# Patient Record
Sex: MALE | Race: WHITE | NOT HISPANIC OR LATINO | Employment: FULL TIME | URBAN - METROPOLITAN AREA
[De-identification: names, ages, dates, MRNs, and addresses within clinical notes are randomized per-mention and may not be internally consistent; named-entity substitution may affect disease eponyms.]

---

## 2017-08-23 ENCOUNTER — OFFICE VISIT (OUTPATIENT)
Dept: FAMILY MEDICINE | Facility: CLINIC | Age: 27
End: 2017-08-23
Payer: COMMERCIAL

## 2017-08-23 VITALS
OXYGEN SATURATION: 98 % | HEART RATE: 54 BPM | HEIGHT: 68 IN | DIASTOLIC BLOOD PRESSURE: 60 MMHG | TEMPERATURE: 97.8 F | RESPIRATION RATE: 19 BRPM | SYSTOLIC BLOOD PRESSURE: 110 MMHG | BODY MASS INDEX: 24.08 KG/M2 | WEIGHT: 158.9 LBS

## 2017-08-23 DIAGNOSIS — R10.32 INGUINAL PAIN, LEFT: Primary | ICD-10-CM

## 2017-08-23 PROCEDURE — 99214 OFFICE O/P EST MOD 30 MIN: CPT | Performed by: FAMILY MEDICINE

## 2017-08-23 NOTE — NURSING NOTE
"Chief Complaint   Patient presents with     Groin Pain       Initial /60 (BP Location: Right arm, Patient Position: Chair, Cuff Size: Adult Regular)  Pulse 54  Temp 97.8  F (36.6  C) (Oral)  Resp 19  Ht 5' 8\" (1.727 m)  Wt 158 lb 14.4 oz (72.1 kg)  SpO2 98%  BMI 24.16 kg/m2 Estimated body mass index is 24.16 kg/(m^2) as calculated from the following:    Height as of this encounter: 5' 8\" (1.727 m).    Weight as of this encounter: 158 lb 14.4 oz (72.1 kg).    Medication Reconciliation: complete    Rachelle Reyes [facility]      Health Maintenance- Has Been Reviewed.                "

## 2017-08-23 NOTE — MR AVS SNAPSHOT
"              After Visit Summary   8/23/2017    Fili Love    MRN: [id number]           Patient Information     Date Of Birth          1990        Visit Information        Provider Department      8/23/2017 3:30 PM Jhoan Hernandez MD [facility]        Today's Diagnoses     Inguinal pain, left    -  1       Follow-ups after your visit        Who to contact     If you have questions or need follow up information about today's clinic visit or your schedule please contact [facility] directly at [phone].  Normal or non-critical lab and imaging results will be communicated to you by SCP Eventshart, letter or phone within 4 business days after the clinic has received the results. If you do not hear from us within 7 days, please contact the clinic through Onfant or phone. If you have a critical or abnormal lab result, we will notify you by phone as soon as possible.  Submit refill requests through Snackr or call your pharmacy and they will forward the refill request to us. Please allow 3 business days for your refill to be completed.          Additional Information About Your Visit        MyChart Information     Snackr gives you secure access to your electronic health record. If you see a primary care provider, you can also send messages to your care team and make appointments. If you have questions, please call your primary care clinic.  If you do not have a primary care provider, please call [phone] and they will assist you.        Care EveryWhere ID     This is your Care EveryWhere ID. This could be used by other organizations to access your [facility] medical records  [id number]        Your Vitals Were     Pulse Temperature Respirations Height Pulse Oximetry BMI (Body Mass Index)    54 97.8  F (36.6  C) (Oral) 19 5' 8\" (1.727 m) 98% 24.16 kg/m2       Blood Pressure from Last 3 Encounters:   08/23/17 110/60   06/30/16 112/60   01/11/16 100/60    Weight from Last 3 " Encounters:   08/23/17 158 lb 14.4 oz (72.1 kg)   06/30/16 153 lb 6.4 oz (69.6 kg)   01/11/16 157 lb 11.2 oz (71.5 kg)                 Today's Medication Changes          These changes are accurate as of: 8/23/17 11:59 PM.  If you have any questions, ask your nurse or doctor.               Stop taking these medicines if you haven't already. Please contact your care team if you have questions.     triamcinolone 0.1 % cream   Commonly known as:  KENALOG   Stopped by:  Jhoan Hernandez MD                    Primary Care Provider Office Phone # Fax #    Jhoan Hernandez [phone] [id number]  [facility] [address]        Equal Access to Services     NEFTALY MELGAR AH: Krystina farriso Sokirstin, waaxda luqadaha, qaybta kaalmada adeegyada, joseluis srivastava . So [facility] [phone].    ATENCIÓN: Si habla español, tiene a moore disposición servicios gratuitos de asistencia lingüística. Llame al [phone].    We comply with applicable federal civil rights laws and [place] laws. We do not discriminate on the basis of race, color, national origin, age, disability sex, sexual orientation or gender identity.            Thank you!     Thank you for choosing [facility]  for your care. Our goal is always to provide you with excellent care. Hearing back from our patients is one way we can continue to improve our services. Please take a few minutes to complete the written survey that you may receive in the mail after your visit with us. Thank you!             Your Updated Medication List - Protect others around you: Learn how to safely use, store and throw away your medicines at www.disposemymeds.org.      Notice  As of 8/23/2017 11:59 PM    You have not been prescribed any medications.

## 2017-08-29 NOTE — PROGRESS NOTES
SUBJECTIVE:                                                    Fili Love is a 26 year old male who presents to clinic today for the following health issues:    Patient presents with:  Groin Pain    Patient with ongoing issue of groin pain in the left groin. Patient does have history of varicocele repair on that side but has not noticed any significant recurrence or mass in the groin or testicle. He has pain with activity and with lifting. We have evaluated for hernia in the past with ultrasound and exam without noting any problem.  Testicle ultrasound shows just small left varicocele and a tiny simple cyst of the left epididymal head, small bilateral hydrocele, no significant findings to explain pain are present. No urinary symptoms. No high-risk sexual contact. No hematochezia or melena or constipation or diarrhea.    Patient Active Problem List   Diagnosis     Keratosis Pilaris     Disorder of male genital organs     STILL'S MURMUR     Past Surgical History:   Procedure Laterality Date     HC EXCISE VARICOCELE  5/07       Social History   Substance Use Topics     Smoking status: Never Smoker     Smokeless tobacco: Never Used     Alcohol use Yes      Comment: social     Family History   Problem Relation Age of Onset     CANCER Paternal Grandmother      BACK     CANCER Paternal Grandfather      TESTICULAR     Thyroid Disease Maternal Grandmother      hypothyroid     Family History Negative Mother      Born 1959     Family History Negative Father      Born 1962     Family History Negative Brother      1     Family History Negative Sister      1         ROS:  CONSTITUTIONAL:NEGATIVE for fever, chills, change in weight  GI: NEGATIVE for nausea, heartburn, or change in bowel habits, no constipation or diarrhea, no hematochezia or melena  : negative for dysuria, hematuria, decreased urinary stream, erectile dysfunction    OBJECTIVE:                                                    /60 (BP Location:  "Right arm, Patient Position: Chair, Cuff Size: Adult Regular)  Pulse 54  Temp 97.8  F (36.6  C) (Oral)  Resp 19  Ht 5' 8\" (1.727 m)  Wt 158 lb 14.4 oz (72.1 kg)  SpO2 98%  BMI 24.16 kg/m2Body mass index is 24.16 kg/(m^2).  GENERAL APPEARANCE: healthy, alert and no distress  ABDOMEN: soft, nontender, without hepatosplenomegaly or masses and bowel sounds normal   (male): testicles normal without atrophy or masses, no hernias and penis normal without urethral discharge  MS: extremities normal- no gross deformities noted  SKIN: no suspicious lesions or rashes     ASSESSMENT/PLAN:                                                    1. Inguinal pain, left  Unclear cause of inguinal pain with normal exam and previous ultrasound showing just minor issues that would not typically cause ongoing pain. Discussed doing CT scan with Valsalva to help rule out possible small hernia as he has exertional/lifting symptoms in the left groin area. If this is normal then consider urology follow up to see if nerve issue and inguinal nerve could be caused symptom after previous surgery.  - CT Abdomen Pelvis w/o Contrast; Future    Jhoan Hernandez MD  [facility]    "

## 2018-01-16 ENCOUNTER — OFFICE VISIT (OUTPATIENT)
Dept: FAMILY MEDICINE | Facility: CLINIC | Age: 28
End: 2018-01-16
Payer: COMMERCIAL

## 2018-01-16 VITALS
HEIGHT: 68 IN | WEIGHT: 167 LBS | RESPIRATION RATE: 19 BRPM | SYSTOLIC BLOOD PRESSURE: 110 MMHG | HEART RATE: 57 BPM | BODY MASS INDEX: 25.31 KG/M2 | TEMPERATURE: 98.4 F | OXYGEN SATURATION: 98 % | DIASTOLIC BLOOD PRESSURE: 62 MMHG

## 2018-01-16 DIAGNOSIS — R00.2 PALPITATIONS: Primary | ICD-10-CM

## 2018-01-16 PROCEDURE — 99214 OFFICE O/P EST MOD 30 MIN: CPT | Performed by: FAMILY MEDICINE

## 2018-01-16 PROCEDURE — 93000 ELECTROCARDIOGRAM COMPLETE: CPT | Performed by: FAMILY MEDICINE

## 2018-01-16 NOTE — MR AVS SNAPSHOT
After Visit Summary   1/16/2018    Fili Love    MRN: [id number]           Patient Information     Date Of Birth          1990        Visit Information        Provider Department      1/16/2018 7:20 AM Jhoan Hernandez MD [facility]        Today's Diagnoses     Palpitations    -  1      Care Instructions                  Heart Palpitations  What are palpitations?   Palpitations are an uncomfortable awareness of your heartbeat. You may feel that your heart is beating harder or faster than usual or that it is skipping beats.   Palpitations are common and often normal. They are a symptom, not a disease. However, it is important to figure out what is causing them.   How do they occur?   Palpitations may be brought on by:   exercise   stress, anxiety, or fear   smoking   alcohol   too much caffeine from coffee, mee, or tea   anemia   heart problems, such as mitral valve prolapse   a thyroid problem   medicines, such as diet pills and decongestants   premenstrual syndrome (PMS)   a lack of certain vitamins or minerals   low blood sugar or an insulin reaction in diabetics.   What are the symptoms?   Symptoms may include:   a thumping, pounding, or racing feeling in your chest or neck   fluttering sensation in your chest   feeling of irregular beating or skipped beats.   How are they diagnosed?   Your healthcare provider will review your symptoms and examine you. You may have an electrocardiogram (ECG) or other tests to help find the cause. You may be given a heart monitor to wear at home. You may have an ultrasound test of the heart called an echocardiogram or an exercise stress test to see if heart problems are causing the palpitations.   How are they treated?   Treatment of palpitations depends on the cause. Most often, no treatment is needed because the heart is otherwise normal. Drinking less coffee or alcohol or none at all may be all you need to do. Trying to reduce the  stress in your life may help. Some medicines can decrease or prevent the palpitations. Talk with your healthcare provider about this.   How can I take care of myself?   Take the medicine prescribed and follow your healthcare provider's advice for lifestyle changes.   Keep a record of when, how often, and for how long you have each episode of palpitations. It is helpful for your provider to know if the palpitations start suddenly or gradually and whether they stop suddenly or gradually. Note what you are doing and whether you notice any other symptoms when you have palpitations.   Do not smoke. Tell your provider if you need help quitting.   Don't drink alcohol. Talk with your provider if you have problems with this.   Keep a healthy weight. If you are overweight, talk to your provider about losing weight.   Eat a healthy diet.   Exercise regularly, according to your provider's advice.   Learn to relax. Reduce stress and anxiety in your life.   Call your healthcare provider right away if:   You have palpitations that last a few hours.   They occur often.   You also have sweating; shortness of breath; lightheadedness; nausea; vomiting; or pain in the chest, arm, back, or jaw.   If the palpitations happen often, particularly if you also have chest pain, breathlessness, or dizziness, you may have another medical problem that your healthcare provider can identify and treat.     Published by [employer].  This content is reviewed periodically and is subject to change as new health information becomes available. The information is intended to inform and educate and is not a replacement for medical evaluation, advice, diagnosis or treatment by a healthcare professional.   Developed by Nuris Latif MD, for [employer].   ? 2010 [employer] and/or its affiliates. All Rights Reserved.   Copyright   [employer] 2011              Follow-ups after your visit        Who to contact     If you have questions or need  "follow up information about today's clinic visit or your schedule please contact [facility] directly at [phone].  Normal or non-critical lab and imaging results will be communicated to you by MyChart, letter or phone within 4 business days after the clinic has received the results. If you do not hear from us within 7 days, please contact the clinic through Flixwagonhart or phone. If you have a critical or abnormal lab result, we will notify you by phone as soon as possible.  Submit refill requests through Cinetraffic or call your pharmacy and they will forward the refill request to us. Please allow 3 business days for your refill to be completed.          Additional Information About Your Visit        [employer]wagon[employer] Information     Cinetraffic gives you secure access to your electronic health record. If you see a primary care provider, you can also send messages to your care team and make appointments. If you have questions, please call your primary care clinic.  If you do not have a primary care provider, please call [phone] and they will assist you.        Care EveryWhere ID     This is your Care EveryWhere ID. This could be used by other organizations to access your [facility] medical records  [id number]        Your Vitals Were     Pulse Temperature Respirations Height Pulse Oximetry BMI (Body Mass Index)    57 98.4  F (36.9  C) (Oral) 19 5' 8\" (1.727 m) 98% 25.39 kg/m2       Blood Pressure from Last 3 Encounters:   01/16/18 110/62   08/23/17 110/60   06/30/16 112/60    Weight from Last 3 Encounters:   01/16/18 167 lb (75.8 kg)   08/23/17 158 lb 14.4 oz (72.1 kg)   06/30/16 153 lb 6.4 oz (69.6 kg)              We Performed the Following     EKG 12-lead complete w/read - Clinics        Primary Care Provider Office Phone # Fax #    Jhoan Hernandez [phone]  [facility] [address]        Equal Access to Services     NEFTALY MELGAR AH: Hadii vernell Bowling " jay li waxcarter irmain hayaajesus cesarkj saavedra. So [facility] [phone].    ATENCIÓN: Si lorenla rin, tiene a moore disposición servicios gratuitos de asistencia lingüística. Llame al [phone].    We comply with applicable federal civil rights laws and [place] laws. We do not discriminate on the basis of race, color, national origin, age, disability, sex, sexual orientation, or gender identity.            Thank you!     Thank you for choosing [facility]  for your care. Our goal is always to provide you with excellent care. Hearing back from our patients is one way we can continue to improve our services. Please take a few minutes to complete the written survey that you may receive in the mail after your visit with us. Thank you!             Your Updated Medication List - Protect others around you: Learn how to safely use, store and throw away your medicines at www.disposemymeds.org.      Notice  As of 1/16/2018  7:52 AM    You have not been prescribed any medications.

## 2018-01-16 NOTE — PROGRESS NOTES
SUBJECTIVE:   Fili Love is a 27 year old male who presents to clinic today for the following health issues:    Patient reports intermittent palpitations everyday for the past three weeks. He also has intermittent difficulties taking a deep breath. The first time this occurred the patient was at the gym working out. Before working out he took a pre workout supplement that has caffeine. Denies fever, cough, heartburn, chest pain.     Problem list and histories reviewed & adjusted, as indicated.  Additional history: as documented    Patient Active Problem List   Diagnosis     Keratosis Pilaris     Disorder of male genital organs     STILL'S MURMUR     Past Surgical History:   Procedure Laterality Date     HC EXCISE VARICOCELE  5/07       Social History   Substance Use Topics     Smoking status: Never Smoker     Smokeless tobacco: Never Used     Alcohol use Yes      Comment: social     Family History   Problem Relation Age of Onset     CANCER Paternal Grandmother      BACK     CANCER Paternal Grandfather      TESTICULAR     Thyroid Disease Maternal Grandmother      hypothyroid     Family History Negative Mother      Born 1959     Family History Negative Father      Born 1962     Family History Negative Brother      1     Family History Negative Sister      1         Reviewed and updated as needed this visit by clinical staffTobacco  Allergies  Meds  Med Hx  Surg Hx  Fam Hx  Soc Hx      Reviewed and updated as needed this visit by Provider         ROS:  C: NEGATIVE for fever  R: as above   CV: as above   GI: NEGATIVE for heartburn or reflux    This document serves as a record of the services and decisions personally performed and made by Jhoan Hernandez MD. It was created on his behalf by Tootie Ortega, a trained medical scribe. The creation of this document is based on the provider's statements to the medical scribe.  Tootie Ortega 7:40 AM January 16, 2018    OBJECTIVE:     /62 (BP Location: Right  "arm, Patient Position: Chair, Cuff Size: Adult Regular)  Pulse 57  Temp 98.4  F (36.9  C) (Oral)  Resp 19  Ht 1.727 m (5' 8\")  Wt 75.8 kg (167 lb)  SpO2 98%  BMI 25.39 kg/m2  Body mass index is 25.39 kg/(m^2).  GENERAL: healthy, alert and no distress  RESP: lungs clear to auscultation - no rales, rhonchi or wheezes  CV: regular rate and rhythm, normal S1 S2, no S3 or S4, no murmur, click or rub, no peripheral edema and peripheral pulses strong  ABDOMEN: soft, nontender, no hepatosplenomegaly, no masses and bowel sounds normal    EKG: appears normal, NSR, normal axis, normal intervals, no acute ST/T changes c/w ischemia, no LVH by voltage criteria, unchanged from previous tracings    ASSESSMENT/PLAN:     1. Palpitations  Appears to be secondary to PVCs or PACs based on history. Normal EKG and exam. No other high risk or concerning symptoms at this time. Follow up if symptoms continue or other associated symptoms occur.  - EKG 12-lead complete w/read - Clinics    The information in this document, created by the medical scribe for me, accurately reflects the services I personally performed and the decisions made by me. I have reviewed and approved this document for accuracy prior to leaving the patient care area.  January 16, 2018 8:06 AM    Jhoan Hernandez MD  [facility]    "

## 2018-01-16 NOTE — NURSING NOTE
"Chief Complaint   Patient presents with     Palpitations       Initial /62 (BP Location: Right arm, Patient Position: Chair, Cuff Size: Adult Regular)  Pulse 57  Temp 98.4  F (36.9  C) (Oral)  Resp 19  Ht 5' 8\" (1.727 m)  Wt 167 lb (75.8 kg)  SpO2 98%  BMI 25.39 kg/m2 Estimated body mass index is 25.39 kg/(m^2) as calculated from the following:    Height as of this encounter: 5' 8\" (1.727 m).    Weight as of this encounter: 167 lb (75.8 kg).    Medication Reconciliation: complete    Health Maintenance addressed:  NONE    n/a    Rachelle Reyes CMA                         "

## 2018-01-16 NOTE — PATIENT INSTRUCTIONS
Heart Palpitations  What are palpitations?   Palpitations are an uncomfortable awareness of your heartbeat. You may feel that your heart is beating harder or faster than usual or that it is skipping beats.   Palpitations are common and often normal. They are a symptom, not a disease. However, it is important to figure out what is causing them.   How do they occur?   Palpitations may be brought on by:   exercise   stress, anxiety, or fear   smoking   alcohol   too much caffeine from coffee, mee, or tea   anemia   heart problems, such as mitral valve prolapse   a thyroid problem   medicines, such as diet pills and decongestants   premenstrual syndrome (PMS)   a lack of certain vitamins or minerals   low blood sugar or an insulin reaction in diabetics.   What are the symptoms?   Symptoms may include:   a thumping, pounding, or racing feeling in your chest or neck   fluttering sensation in your chest   feeling of irregular beating or skipped beats.   How are they diagnosed?   Your healthcare provider will review your symptoms and examine you. You may have an electrocardiogram (ECG) or other tests to help find the cause. You may be given a heart monitor to wear at home. You may have an ultrasound test of the heart called an echocardiogram or an exercise stress test to see if heart problems are causing the palpitations.   How are they treated?   Treatment of palpitations depends on the cause. Most often, no treatment is needed because the heart is otherwise normal. Drinking less coffee or alcohol or none at all may be all you need to do. Trying to reduce the stress in your life may help. Some medicines can decrease or prevent the palpitations. Talk with your healthcare provider about this.   How can I take care of myself?   Take the medicine prescribed and follow your healthcare provider's advice for lifestyle changes.   Keep a record of when, how often, and for how long you have each episode of  palpitations. It is helpful for your provider to know if the palpitations start suddenly or gradually and whether they stop suddenly or gradually. Note what you are doing and whether you notice any other symptoms when you have palpitations.   Do not smoke. Tell your provider if you need help quitting.   Don't drink alcohol. Talk with your provider if you have problems with this.   Keep a healthy weight. If you are overweight, talk to your provider about losing weight.   Eat a healthy diet.   Exercise regularly, according to your provider's advice.   Learn to relax. Reduce stress and anxiety in your life.   Call your healthcare provider right away if:   You have palpitations that last a few hours.   They occur often.   You also have sweating; shortness of breath; lightheadedness; nausea; vomiting; or pain in the chest, arm, back, or jaw.   If the palpitations happen often, particularly if you also have chest pain, breathlessness, or dizziness, you may have another medical problem that your healthcare provider can identify and treat.     Published by [employer].  This content is reviewed periodically and is subject to change as new health information becomes available. The information is intended to inform and educate and is not a replacement for medical evaluation, advice, diagnosis or treatment by a healthcare professional.   Developed by Nuris Latif MD, for [employer].   ? 2010 [employer] and/or its affiliates. All Rights Reserved.   Copyright   [employer] 2011

## 2018-11-07 ENCOUNTER — OFFICE VISIT (OUTPATIENT)
Dept: FAMILY MEDICINE | Facility: CLINIC | Age: 28
End: 2018-11-07
Payer: COMMERCIAL

## 2018-11-07 VITALS
SYSTOLIC BLOOD PRESSURE: 108 MMHG | DIASTOLIC BLOOD PRESSURE: 66 MMHG | TEMPERATURE: 98.2 F | HEART RATE: 52 BPM | OXYGEN SATURATION: 98 % | HEIGHT: 68 IN | RESPIRATION RATE: 16 BRPM | BODY MASS INDEX: 24.25 KG/M2 | WEIGHT: 160 LBS

## 2018-11-07 DIAGNOSIS — R10.32 LEFT INGUINAL PAIN: ICD-10-CM

## 2018-11-07 DIAGNOSIS — R21 SKIN RASH: Primary | ICD-10-CM

## 2018-11-07 PROCEDURE — 99213 OFFICE O/P EST LOW 20 MIN: CPT | Performed by: FAMILY MEDICINE

## 2018-11-07 NOTE — PROGRESS NOTES
SUBJECTIVE:   Fili Love is a 28 year old male who presents to clinic today for the following health issues:    Patient reports skin irritation located at his waistline for the past two months. This worsens with activity.      Patient with history of varicocele repair. Patient here with ongoing groin pains. He describes the pain as pressure or a sensation of a lump, feeling like a hernia.  However, there is no lump or bump at that area. The pain worsens while wearing compression shorts, with activity and performing ab workouts. This was evaluated for a hernia in the past. The ultrasound and exam were both normal. The testicle ultrasound showed just small left varicocele and a tiny simple cyst of the left epididymal head, small bilateral hydrocele, no significant findings to explain pain are present.     Problem list and histories reviewed & adjusted, as indicated.  Additional history: as documented    Patient Active Problem List   Diagnosis     Keratosis Pilaris     Disorder of male genital organs     STILL'S MURMUR     Past Surgical History:   Procedure Laterality Date     HC EXCISE VARICOCELE  5/07       Social History   Substance Use Topics     Smoking status: Never Smoker     Smokeless tobacco: Never Used     Alcohol use Yes      Comment: social     Family History   Problem Relation Age of Onset     Cancer Paternal Grandmother      BACK     Cancer Paternal Grandfather      TESTICULAR     Thyroid Disease Maternal Grandmother      hypothyroid     Family History Negative Mother      Born 1959     Family History Negative Father      Born 1962     Family History Negative Brother      1     Family History Negative Sister      1           Reviewed and updated as needed this visit by clinical staff  Tobacco  Allergies  Meds  Soc Hx      Reviewed and updated as needed this visit by Provider         ROS:  INTEGUMENTARY/SKIN: as noted above   : as noted above     This document serves as a record of the  "services and decisions personally performed and made by Jhoan Hernandez MD. It was created on his behalf by Tootie Ortega, a trained medical scribe. The creation of this document is based on the provider's statements to the medical scribe.  Tootie Ortega 2:49 PM November 7, 2018    OBJECTIVE:     /66 (BP Location: Right arm, Patient Position: Chair, Cuff Size: Adult Regular)  Pulse 52  Temp 98.2  F (36.8  C) (Oral)  Resp 16  Ht 1.727 m (5' 8\")  Wt 72.6 kg (160 lb)  SpO2 98%  BMI 24.33 kg/m2  Body mass index is 24.33 kg/(m^2).  GENERAL: healthy, alert and no distress   (male): normal male genitalia without lesions or urethral discharge, no hernia  SKIN: 2 X 3 cm slight erythema anterior belt line     ASSESSMENT/PLAN:     1. Skin rash  Unclear mild rash - discussed trial of treatment for tinea vs dermatitis with topical OTC treatments.  Follow-up if not improving in 2 months.    2. Left inguinal pain  Unclear if inguinal nerve irritation with prior surgery vs hernia (not noted on exam) vs musculoskeletal/radiculopathy.  Could consider pelvic CT with valsalva vs evaluation with surgeon for their opinion - patient would like to see surgery.  - GENERAL SURG ADULT REFERRAL    The information in this document, created by the medical scribe for me, accurately reflects the services I personally performed and the decisions made by me. I have reviewed and approved this document for accuracy prior to leaving the patient care area.  November 7, 2018 3:03 PM    Jhoan Hernandez MD  [facility]    "

## 2018-11-07 NOTE — MR AVS SNAPSHOT
"              After Visit Summary   11/7/2018    Fili Love    MRN: [id number]           Patient Information     Date Of Birth          1990        Visit Information        Provider Department      11/7/2018 2:40 PM Jhoan Hernandez MD [facility]        Today's Diagnoses     Skin rash    -  1       Follow-ups after your visit        Who to contact     If you have questions or need follow up information about today's clinic visit or your schedule please contact [facility] directly at [phone].  Normal or non-critical lab and imaging results will be communicated to you by MyChart, letter or phone within 4 business days after the clinic has received the results. If you do not hear from us within 7 days, please contact the clinic through [employer] or phone. If you have a critical or abnormal lab result, we will notify you by phone as soon as possible.  Submit refill requests through [employer] or call your pharmacy and they will forward the refill request to us. Please allow 3 business days for your refill to be completed.          Additional Information About Your Visit        MyChart Information     [facility] gives you secure access to your electronic health record. If you see a primary care provider, you can also send messages to your care team and make appointments. If you have questions, please call your primary care clinic.  If you do not have a primary care provider, please call [phone] and they will assist you.        Care EveryWhere ID     This is your Care EveryWhere ID. This could be used by other organizations to access your [place] medical records  [id number]        Your Vitals Were     Pulse Temperature Respirations Height Pulse Oximetry BMI (Body Mass Index)    52 98.2  F (36.8  C) (Oral) 16 5' 8\" (1.727 m) 98% 24.33 kg/m2       Blood Pressure from Last 3 Encounters:   11/07/18 108/66   01/16/18 110/62   08/23/17 110/60    Weight from Last 3 Encounters: "   11/07/18 160 lb (72.6 kg)   01/16/18 167 lb (75.8 kg)   08/23/17 158 lb 14.4 oz (72.1 kg)              Today, you had the following     No orders found for display       Primary Care Provider Office Phone # Fax #    Jhoan Hernandez [phone] [id number]  [facility] [id number]        Equal Access to Services     NEFTALY MELGAR : Hadii aad ku hadasho Soomaali, waaxda luqadaha, qaybta kaalmada adeegyada, waxay idiin hayaan adeeg kharash lagunjan . So [facility] [phone].    ATENCIÓN: Si habla español, tiene a moore disposición servicios gratuitos de asistencia lingüística. Llame al [phone].    We comply with applicable federal civil rights laws and [place] laws. We do not discriminate on the basis of race, color, national origin, age, disability, sex, sexual orientation, or gender identity.            Thank you!     Thank you for choosing [facility]  for your care. Our goal is always to provide you with excellent care. Hearing back from our patients is one way we can continue to improve our services. Please take a few minutes to complete the written survey that you may receive in the mail after your visit with us. Thank you!             Your Updated Medication List - Protect others around you: Learn how to safely use, store and throw away your medicines at www.disposemymeds.org.      Notice  As of 11/7/2018  2:55 PM    You have not been prescribed any medications.

## 2019-12-16 ENCOUNTER — HEALTH MAINTENANCE LETTER (OUTPATIENT)
Age: 29
End: 2019-12-16

## 2019-12-20 ENCOUNTER — MYC MEDICAL ADVICE (OUTPATIENT)
Dept: FAMILY MEDICINE | Facility: CLINIC | Age: 29
End: 2019-12-20

## 2019-12-23 NOTE — TELEPHONE ENCOUNTER
Please advise if pt should have an ultrasound first before seeing urology  Matt Mendieta RN, BSN

## 2019-12-23 NOTE — TELEPHONE ENCOUNTER
See my last note.  I was thinking he could have an inguinal neuralgia that he has related to his prior groin surgery.  Recommended consider CT with Valsalva of pelvis to rule out hernia and see general surgery.  At this point I think that follow-up with specialist first is likely more appropriate and they can order appropriate imaging after being seen.

## 2020-12-07 ASSESSMENT — ENCOUNTER SYMPTOMS
COUGH: 0
PARESTHESIAS: 0
FREQUENCY: 0
ARTHRALGIAS: 0
SORE THROAT: 0
NERVOUS/ANXIOUS: 0
HEMATURIA: 0
DIZZINESS: 0
CONSTIPATION: 0
SHORTNESS OF BREATH: 0
WEAKNESS: 0
HEARTBURN: 0
MYALGIAS: 0
NAUSEA: 0
JOINT SWELLING: 0
CHILLS: 0
FEVER: 0
HEMATOCHEZIA: 0
EYE PAIN: 0
DIARRHEA: 0
PALPITATIONS: 0
HEADACHES: 0
ABDOMINAL PAIN: 0
DYSURIA: 0

## 2020-12-08 ENCOUNTER — OFFICE VISIT (OUTPATIENT)
Dept: FAMILY MEDICINE | Facility: CLINIC | Age: 30
End: 2020-12-08
Payer: COMMERCIAL

## 2020-12-08 VITALS
HEART RATE: 67 BPM | BODY MASS INDEX: 24.4 KG/M2 | SYSTOLIC BLOOD PRESSURE: 110 MMHG | DIASTOLIC BLOOD PRESSURE: 64 MMHG | RESPIRATION RATE: 16 BRPM | WEIGHT: 161 LBS | TEMPERATURE: 97.9 F | OXYGEN SATURATION: 99 % | HEIGHT: 68 IN

## 2020-12-08 DIAGNOSIS — N50.812 PAIN IN LEFT TESTICLE: Primary | ICD-10-CM

## 2020-12-08 DIAGNOSIS — Z11.59 NEED FOR HEPATITIS C SCREENING TEST: ICD-10-CM

## 2020-12-08 DIAGNOSIS — Z00.00 VISIT FOR PREVENTIVE HEALTH EXAMINATION: ICD-10-CM

## 2020-12-08 DIAGNOSIS — Z11.4 SCREENING FOR HIV (HUMAN IMMUNODEFICIENCY VIRUS): ICD-10-CM

## 2020-12-08 PROCEDURE — 99395 PREV VISIT EST AGE 18-39: CPT | Performed by: FAMILY MEDICINE

## 2020-12-08 PROCEDURE — 99213 OFFICE O/P EST LOW 20 MIN: CPT | Mod: 25 | Performed by: FAMILY MEDICINE

## 2020-12-08 ASSESSMENT — ENCOUNTER SYMPTOMS
HEADACHES: 0
NERVOUS/ANXIOUS: 0
WEAKNESS: 0
CONSTIPATION: 0
ARTHRALGIAS: 0
PARESTHESIAS: 0
ABDOMINAL PAIN: 0
CHILLS: 0
DIZZINESS: 0
JOINT SWELLING: 0
HEMATOCHEZIA: 0
NAUSEA: 0
PALPITATIONS: 0
FEVER: 0
DIARRHEA: 0
SHORTNESS OF BREATH: 0
SORE THROAT: 0
FREQUENCY: 0
MYALGIAS: 0
HEARTBURN: 0
COUGH: 0
HEMATURIA: 0
EYE PAIN: 0
DYSURIA: 0

## 2020-12-08 ASSESSMENT — MIFFLIN-ST. JEOR: SCORE: 1664.79

## 2020-12-08 NOTE — PROGRESS NOTES
Subjective     Fili Love is a 30 year old male who presents to clinic today for the following health issues:    Pain  Pertinent negatives include no abdominal pain, arthralgias, chest pain, chills, congestion, coughing, fever, headaches, joint swelling, myalgias, nausea, rash, sore throat or weakness.   Healthy Habits:     Getting at least 3 servings of Calcium per day:  Yes    Bi-annual eye exam:  Yes    Dental care twice a year:  Yes    Sleep apnea or symptoms of sleep apnea:  None    Diet:  Regular (no restrictions)    Frequency of exercise:  4-5 days/week    Duration of exercise:  45-60 minutes    Taking medications regularly:  Yes    Medication side effects:  Not applicable    PHQ-2 Total Score: 0    Additional concerns today:  No           Musculoskeletal problem/pain  Onset/Duration: x  2 months  Description  Location: testicle - left  Joint Swelling: no  Redness: no  Pain: YES, intermittent discomfort.  Currently no pain.  Occasionally occurs and does not seem to be related to physical activity.  Warmth: no  Intensity:  moderate  Progression of Symptoms:  worsening  Accompanying signs and symptoms:   Fevers: no  Numbness/tingling/weakness: no  History  Trauma to the area: no  Recent illness:  no  Previous similar problem: no  Previous evaluation:  no  Precipitating or alleviating factors:  Aggravating factors include: None.   Therapies tried and outcome: nothing          Review of Systems   Constitutional: Negative for chills and fever.   HENT: Negative for congestion, ear pain, hearing loss and sore throat.    Eyes: Negative for pain and visual disturbance.   Respiratory: Negative for cough and shortness of breath.    Cardiovascular: Negative for chest pain, palpitations and peripheral edema.   Gastrointestinal: Negative for abdominal pain, constipation, diarrhea, heartburn, hematochezia and nausea.   Genitourinary: Negative for discharge, dysuria, frequency, genital sores, hematuria, impotence and  "urgency.   Musculoskeletal: Negative for arthralgias, joint swelling and myalgias.   Skin: Negative for rash.   Neurological: Negative for dizziness, weakness, headaches and paresthesias.   Psychiatric/Behavioral: Negative for mood changes. The patient is not nervous/anxious.           Objective    /64 (BP Location: Right arm, Patient Position: Chair, Cuff Size: Adult Regular)   Pulse 67   Temp 97.9  F (36.6  C) (Oral)   Resp 16   Ht 1.727 m (5' 8\")   Wt 73 kg (161 lb)   SpO2 99%   BMI 24.48 kg/m    Body mass index is 24.48 kg/m .  Physical Exam  Vitals signs reviewed.   Constitutional:       General: He is not in acute distress.     Appearance: Normal appearance. He is not ill-appearing.   HENT:      Head: Normocephalic and atraumatic.      Right Ear: External ear normal.      Left Ear: External ear normal.      Nose: Nose normal.      Mouth/Throat:      Mouth: Mucous membranes are moist.      Pharynx: Oropharynx is clear.   Eyes:      General: No scleral icterus.        Right eye: No discharge.         Left eye: No discharge.      Extraocular Movements: Extraocular movements intact.      Pupils: Pupils are equal, round, and reactive to light.   Neck:      Musculoskeletal: Normal range of motion.      Vascular: No JVD.   Cardiovascular:      Rate and Rhythm: Normal rate and regular rhythm.   Pulmonary:      Effort: Pulmonary effort is normal. No respiratory distress.      Breath sounds: Normal breath sounds.   Abdominal:      General: Abdomen is flat. Bowel sounds are normal.      Palpations: Abdomen is soft.   Genitourinary:     Comments: Penis appears normal, no expressible urethral discharge.  Palpable inguinal hernias.  Bilateral testicles are nontender to palpation.  Epididymis unremarkable.  No obvious masses.  Musculoskeletal:         General: No swelling.   Lymphadenopathy:      Cervical: No cervical adenopathy.   Skin:     General: Skin is warm.      Capillary Refill: Capillary refill takes " "less than 2 seconds.   Neurological:      General: No focal deficit present.      Mental Status: He is alert.   Psychiatric:         Mood and Affect: Mood normal.         Behavior: Behavior normal.          1-: \"IMPRESSION:   1. Small left varicocele.  2. Tiny simple cyst in the left epididymal head.  3. Small bilateral hydroceles.    4. No convincing sonographic evidence for epididymitis.  \"        Assessment & Plan     Visit for preventive health examination    Pain in left testicle  Subacute problem.  History of left-sided varicocele, epididymitis.  He is status post a varicocele excision in 2006.  Physical examination overall unremarkable.  Currently do not suspect testicular torsion.  I do think he needs a repeat ultrasound for further characterization as he did have a history of a left epididymal cyst.  No urinalysis collected as he has no lower urinary tract symptoms.  Patient has self referred to urology and has an appointment as well next month.  Advised to keep appointment.  Abruptly having left testicle pain that is getting worse, go to the emergency department.  - US Testicular and Scrotum; Future    Screening for HIV (human immunodeficiency virus)  Patient declined blood work.    Need for hepatitis C screening test            Return in about 1 year (around 12/8/2021) for Annual Preventative Visit..    Trey Adame MD  [facility]    "

## 2020-12-08 NOTE — PATIENT INSTRUCTIONS
Patient Education     Testicular Pain, Unclear Cause   You have had pain in one or both testicles. Based on your exam today, the exact cause of your pain is not certain. But your condition doesn't appear to be dangerous. Testicles are very sensitive. Even a small injury can cause quite a bit of pain. Other possible causes of testicular pain include kidney stones, cysts, mumps, inflammatory conditions, chronic conditions, hernia, infection, and a twisted testicle.  Certain tests may be done to rule out an underlying problem causing the pain. Nothing conclusive was found today. Most likely, the pain will go away on its own. If it doesn t, you may need more tests.    Home care  Medicine may be prescribed to help relieve pain and swelling. This may be an over-the-counter pain reliever or prescription pain medicine. Take all medicine as directed.  The following are general care guidelines:    To relieve pain and swelling, apply an ice pack wrapped in a thin towel for 10 minutes at a time. Continue this on and off for 1 to 2 days.    When lying down, place a small rolled towel under your scrotum. When moving around, wear a jockstrap (athletic supporter) or supportive underwear. These will help support and protect your testicles.    If it hurts to walk, walk as little as possible until you feel better.    Don't do any strenuous activity until you feel better.    Don't have sex until you feel better.    If you have severe pain in the testicle, seek care right away. Delay may lead to permanent loss of the testicle s function.  Follow-up care  Follow up with your healthcare provider, or as advised.  When to seek medical advice  Call your healthcare provider right away if any of these occur:    Fever of 100.4 F (38 C) or higher, or as directed by your provider    Worsening of the pain or severe pain    Swelling of the testicle or scrotum    A lump in the scrotum    Warm and red scrotum (signs of infection)    Nausea and  vomiting    Pain or swelling in abdomen    Trouble peeing    Numbness or weakness in the leg    Shrinking of the testicle    Blood in your urine  Lesa last reviewed this educational content on 9/1/2019 2000-2020 [employer]. [address] [place] [address], [place] [address]. All rights reserved. This information is not intended as a substitute for professional medical care. Always follow your healthcare professional's instructions.

## 2020-12-14 ENCOUNTER — HOSPITAL ENCOUNTER (OUTPATIENT)
Dept: ULTRASOUND IMAGING | Facility: CLINIC | Age: 30
Discharge: HOME | End: 2020-12-14
Attending: FAMILY MEDICINE | Admitting: FAMILY MEDICINE
Payer: COMMERCIAL

## 2020-12-14 DIAGNOSIS — N50.812 PAIN IN LEFT TESTICLE: ICD-10-CM

## 2020-12-14 PROCEDURE — 76870 US EXAM SCROTUM: CPT

## 2021-01-06 ENCOUNTER — OFFICE VISIT (OUTPATIENT)
Dept: UROLOGY | Facility: CLINIC | Age: 31
End: 2021-01-06
Payer: COMMERCIAL

## 2021-01-06 VITALS
HEART RATE: 61 BPM | SYSTOLIC BLOOD PRESSURE: 122 MMHG | DIASTOLIC BLOOD PRESSURE: 82 MMHG | OXYGEN SATURATION: 98 % | HEIGHT: 69 IN | BODY MASS INDEX: 23.7 KG/M2 | WEIGHT: 160 LBS

## 2021-01-06 DIAGNOSIS — I86.1 VARICOCELE: Primary | ICD-10-CM

## 2021-01-06 LAB
ALBUMIN UR-MCNC: NEGATIVE MG/DL
APPEARANCE UR: CLEAR
BILIRUB UR QL STRIP: NEGATIVE
COLOR UR AUTO: YELLOW
GLUCOSE UR STRIP-MCNC: NEGATIVE MG/DL
HGB UR QL STRIP: NEGATIVE
KETONES UR STRIP-MCNC: NEGATIVE MG/DL
LEUKOCYTE ESTERASE UR QL STRIP: NEGATIVE
NITRATE UR QL: NEGATIVE
PH UR STRIP: 7.5 PH (ref 5–7)
SOURCE: ABNORMAL
SP GR UR STRIP: 1.01 (ref 1–1.03)
UROBILINOGEN UR STRIP-ACNC: 0.2 EU/DL (ref 0.2–1)

## 2021-01-06 PROCEDURE — 81003 URINALYSIS AUTO W/O SCOPE: CPT | Performed by: UROLOGY

## 2021-01-06 PROCEDURE — 99203 OFFICE O/P NEW LOW 30 MIN: CPT | Performed by: UROLOGY

## 2021-01-06 ASSESSMENT — PAIN SCALES - GENERAL: PAINLEVEL: NO PAIN (0)

## 2021-01-06 ASSESSMENT — MIFFLIN-ST. JEOR: SCORE: 1676.14

## 2021-01-06 NOTE — PROGRESS NOTES
Urology Consult History and Physical  [place]   Name: Fili Love    MRN: [id number]   YOB: 1990       We were asked to see Fili Love at the request of Dr. Adame for evaluation and treatment of bilateral testicular discomfort, bilateral varicocele.        Chief Complaint:   bilateral testicular discomfort, bilateral varicocele    History is obtained from the patient            History of Present Illness:   Fili Love is a 30 year old male who is being seen for evaluation of bilateral testicular discomfort, bilateral varicocele    Having intermittent testicular pain over the past few years  Mostly a dull irritation which is worse mountain biking or physical activity   Know about a varicoele for years  Discomfort is generally on the left side  Discomfort or irritation is very mild    Had a prior left laparoscopic varicocele repair 5/11/2007             Past Medical History:     Past Medical History:   Diagnosis Date     Epididymitis             Past Surgical History:     Past Surgical History:   Procedure Laterality Date     HC EXCISE VARICOCELE  5/07            Social History:     Social History     Tobacco Use     Smoking status: Never Smoker     Smokeless tobacco: Never Used   Substance Use Topics     Alcohol use: Yes     Comment: social       History   Smoking Status     Never Smoker   Smokeless Tobacco     Never Used            Family History:     Family History   Problem Relation Age of Onset     Cancer Paternal Grandmother         BACK     Cancer Paternal Grandfather         TESTICULAR     Thyroid Disease Maternal Grandmother         hypothyroid     Family History Negative Mother         Born 1959     Family History Negative Father         Born 1962     Family History Negative Brother         1     Family History Negative Sister         1              Allergies:   No Known Allergies         Medications:     No current outpatient medications on file.     No current  "facility-administered medications for this visit.              Review of Systems:     Skin: negative  Eyes: negative  Ears/Nose/Throat: negative  Respiratory: No shortness of breath, dyspnea on exertion, cough, or hemoptysis  Cardiovascular: negative  Gastrointestinal: negative  Genitourinary: as above  Musculoskeletal: negative  Neurologic: negative  Psychiatric: negative  Hematologic/Lymphatic/Immunologic: negative  Endocrine: negative          Physical Exam:     Patient Vitals for the past 24 hrs:   BP Pulse SpO2 Height Weight   01/06/21 1619 122/82 61 98 % 1.753 m (5' 9\") 72.6 kg (160 lb)     Body mass index is 23.63 kg/m .     General: age-appropriate appearing male in NAD  HEENT: Head AT/NC, EOMI, CN Grossly intact  Lungs: no respiratory distress, or pursed lip breathing  Heart: No obvious jugular venous distension present  Back: no bony midline tenderness, no CVAT bilaterally.  Abdomen: soft, non-distended, non-tender. No organomegaly  : normal circumcised phallus, normal testis bilaterally with symmetric size, normal epididymus bilaterally, no notable varicocele visible on exam with or without valsalva  Lymph: no palpable inguinal lymphadenopathy.  LE: no edema.   Musculoskeltal: extremities normal, no peripheral edema  Skin: no suspicious lesions or rashes  Neuro: Alert, oriented, speech and mentation normal;  moving all 4 extremities equally.  Psych: affect and mood normal          Data:   All laboratory data reviewed:    UA RESULTS:  Recent Labs   Lab Test 01/11/16  0739   COLOR Yellow   APPEARANCE Clear   URINEGLC Negative   URINEBILI Negative   URINEKETONE Negative   SG 1.015   UBLD Negative   URINEPH 6.0   PROTEIN Negative   UROBILINOGEN 0.2   NITRITE Negative   LEUKEST Negative      Lab Results   Component Value Date    CR 1.00 08/01/2011      IMAGING:  All pertinent imaging reviewed:    All imaging studies reviewed by me.  I personally reviewed these imaging films.  A formal report from radiology " will follow.    FINDINGS:     RIGHT: Right testicle measures 4.3 x 3.7 x 2.6 cm. Normal testicle  with no masses. Normal arterial duplex and normal color flow. Normal  epididymis. Mild hydrocele. Mild varicocele, new since 1/19/2016     LEFT: Left testicle measures 4.6 x 3.1 x 2.1 cm. Normal testicle with  no masses. Normal arterial duplex and normal color flow. Normal  epididymis with a 3 mm benign cyst in the epididymal head. Mild  hydrocele. Mild varicocele, not significantly changed.                                                                      IMPRESSION:  1.  Mild bilateral varicoceles, new on the right since 2016 and  unchanged on the left.  2.  Mild bilateral hydroceles, not significantly changed.  3.  Stable small benign left epididymal head cyst.         Impression and Plan:   Impression:   30 year old man with history of left varicocele s/p laparoscopic repair in 2007 now with bilateral Grade 0/1 subclinical varicoceles and intermittent vague testicular irritation      Plan:   Bilateral varicocele on U/S  - I reviewed his U/S images  - I reviewed his prior operative report  - We discussed that on clinical exam he would be categorized at grade 0/1 varicoceles which are not identifiable on exam  - We discussed the indications for treatment would be worsening or bothersome pain which would impact his daily activities  - We discussed that at this time I recommend observation and he may follow up PRN     Thank you for the kind consultation.    Time spent: 14 minutes spent on the date of the encounter doing chart review, history and exam, documentation and further activities as noted above.    Nam Salvador MD   Urology  [facility]  [facility] [place] Phone: [phone]  [facility] Phone: [phone]

## 2021-01-06 NOTE — NURSING NOTE
Chief Complaint   Patient presents with     tesicle pain     patient states it is more irritation. It has been going on for a few years   Felicia Bartlett LPN

## 2021-01-06 NOTE — LETTER
1/6/2021       RE: Fili Love  [address] [facility] [address]     Dear Colleague,    Thank you for referring your patient, Fili Love, to the [facility] [place] [facility]. Please see a copy of my visit note below.    Urology Consult History and Physical  [place]   Name: Fili Love    MRN: [id number]   YOB: 1990       We were asked to see Fili Love at the request of Dr. Adame for evaluation and treatment of bilateral testicular discomfort, bilateral varicocele.        Chief Complaint:   bilateral testicular discomfort, bilateral varicocele    History is obtained from the patient            History of Present Illness:   Fili Love is a 30 year old male who is being seen for evaluation of bilateral testicular discomfort, bilateral varicocele    Having intermittent testicular pain over the past few years  Mostly a dull irritation which is worse mountain biking or physical activity   Know about a varicoele for years  Discomfort is generally on the left side  Discomfort or irritation is very mild    Had a prior left laparoscopic varicocele repair 5/11/2007             Past Medical History:     Past Medical History:   Diagnosis Date     Epididymitis             Past Surgical History:     Past Surgical History:   Procedure Laterality Date     HC EXCISE VARICOCELE  5/07            Social History:     Social History     Tobacco Use     Smoking status: Never Smoker     Smokeless tobacco: Never Used   Substance Use Topics     Alcohol use: Yes     Comment: social       History   Smoking Status     Never Smoker   Smokeless Tobacco     Never Used            Family History:     Family History   Problem Relation Age of Onset     Cancer Paternal Grandmother         BACK     Cancer Paternal Grandfather         TESTICULAR     Thyroid Disease Maternal Grandmother         hypothyroid     Family History Negative Mother   "       Born 1959     Family History Negative Father         Born 1962     Family History Negative Brother         1     Family History Negative Sister         1              Allergies:   No Known Allergies         Medications:     No current outpatient medications on file.     No current facility-administered medications for this visit.              Review of Systems:     Skin: negative  Eyes: negative  Ears/Nose/Throat: negative  Respiratory: No shortness of breath, dyspnea on exertion, cough, or hemoptysis  Cardiovascular: negative  Gastrointestinal: negative  Genitourinary: as above  Musculoskeletal: negative  Neurologic: negative  Psychiatric: negative  Hematologic/Lymphatic/Immunologic: negative  Endocrine: negative          Physical Exam:     Patient Vitals for the past 24 hrs:   BP Pulse SpO2 Height Weight   01/06/21 1619 122/82 61 98 % 1.753 m (5' 9\") 72.6 kg (160 lb)     Body mass index is 23.63 kg/m .     General: age-appropriate appearing male in NAD  HEENT: Head AT/NC, EOMI, CN Grossly intact  Lungs: no respiratory distress, or pursed lip breathing  Heart: No obvious jugular venous distension present  Back: no bony midline tenderness, no CVAT bilaterally.  Abdomen: soft, non-distended, non-tender. No organomegaly  : normal circumcised phallus, normal testis bilaterally with symmetric size, normal epididymus bilaterally, no notable varicocele visible on exam with or without valsalva  Lymph: no palpable inguinal lymphadenopathy.  LE: no edema.   Musculoskeltal: extremities normal, no peripheral edema  Skin: no suspicious lesions or rashes  Neuro: Alert, oriented, speech and mentation normal;  moving all 4 extremities equally.  Psych: affect and mood normal          Data:   All laboratory data reviewed:    UA RESULTS:  Recent Labs   Lab Test 01/11/16  0739   COLOR Yellow   APPEARANCE Clear   URINEGLC Negative   URINEBILI Negative   URINEKETONE Negative   SG 1.015   UBLD Negative   URINEPH 6.0   PROTEIN " Negative   UROBILINOGEN 0.2   NITRITE Negative   LEUKEST Negative      Lab Results   Component Value Date    CR 1.00 08/01/2011      IMAGING:  All pertinent imaging reviewed:    All imaging studies reviewed by me.  I personally reviewed these imaging films.  A formal report from radiology will follow.    FINDINGS:     RIGHT: Right testicle measures 4.3 x 3.7 x 2.6 cm. Normal testicle  with no masses. Normal arterial duplex and normal color flow. Normal  epididymis. Mild hydrocele. Mild varicocele, new since 1/19/2016     LEFT: Left testicle measures 4.6 x 3.1 x 2.1 cm. Normal testicle with  no masses. Normal arterial duplex and normal color flow. Normal  epididymis with a 3 mm benign cyst in the epididymal head. Mild  hydrocele. Mild varicocele, not significantly changed.                                                                      IMPRESSION:  1.  Mild bilateral varicoceles, new on the right since 2016 and  unchanged on the left.  2.  Mild bilateral hydroceles, not significantly changed.  3.  Stable small benign left epididymal head cyst.         Impression and Plan:   Impression:   30 year old man with history of left varicocele s/p laparoscopic repair in 2007 now with bilateral Grade 0/1 subclinical varicoceles and intermittent vague testicular irritation      Plan:   Bilateral varicocele on U/S  - I reviewed his U/S images  - I reviewed his prior operative report  - We discussed that on clinical exam he would be categorized at grade 0/1 varicoceles which are not identifiable on exam  - We discussed the indications for treatment would be worsening or bothersome pain which would impact his daily activities  - We discussed that at this time I recommend observation and he may follow up PRN     Thank you for the kind consultation.    Time spent: 14 minutes spent on the date of the encounter doing chart review, history and exam, documentation and further activities as noted above.    Nam Salvador MD    Urology  [facility]  [facility] Phone: [phone]  [facility] Phone: [phone]

## 2021-05-13 ENCOUNTER — OFFICE VISIT (OUTPATIENT)
Dept: FAMILY MEDICINE | Facility: CLINIC | Age: 31
End: 2021-05-13
Payer: COMMERCIAL

## 2021-05-13 VITALS
SYSTOLIC BLOOD PRESSURE: 110 MMHG | RESPIRATION RATE: 16 BRPM | TEMPERATURE: 97.4 F | WEIGHT: 160 LBS | DIASTOLIC BLOOD PRESSURE: 64 MMHG | OXYGEN SATURATION: 99 % | HEART RATE: 60 BPM | BODY MASS INDEX: 23.63 KG/M2

## 2021-05-13 DIAGNOSIS — Z01.818 PRE-OP EXAM: Primary | ICD-10-CM

## 2021-05-13 DIAGNOSIS — S92.902K CLOSED FRACTURE OF LEFT FOOT WITH NONUNION, SUBSEQUENT ENCOUNTER: ICD-10-CM

## 2021-05-13 PROCEDURE — 99214 OFFICE O/P EST MOD 30 MIN: CPT | Performed by: PHYSICIAN ASSISTANT

## 2021-05-13 NOTE — PROGRESS NOTES
[facility]  [address] [address]  Phone: [phone]  Primary Provider: Jhoan Hernandez  Pre-op Performing Provider: AASEBY-AGUILERA, RAMONA ANN      PREOPERATIVE EVALUATION:  Today's date: 5/13/2021    Fili Love is a 30 year old male who presents for a preoperative evaluation.    Surgical Information:  Surgery/Procedure: fix navicular fracture and fuse talus  Surgery Location: [facility]  Surgeon: Michael Marte MD  Surgery Date: 5/17  Time of Surgery: 9:00  Where patient plans to recover: At home with family  Fax number for surgical facility: [phone]    Type of Anesthesia Anticipated: General    Assessment & Plan     The proposed surgical procedure is considered INTERMEDIATE risk.    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                      Subjective     HPI related to upcoming procedure: fractured foot 2 week ago    Preop Questions 5/13/2021   1. Have you ever had a heart attack or stroke? No   2. Have you ever had surgery on your heart or blood vessels, such as a stent placement, a coronary artery bypass, or surgery on an artery in your head, neck, heart, or legs? No   3. Do you have chest pain with activity? No   4. Do you have a history of  heart failure? No   5. Do you currently have a cold, bronchitis or symptoms of other infection? No   6. Do you have a cough, shortness of breath, or wheezing? No   7. Do you or anyone in your family have previous history of blood clots? No   8.  Do you or does anyone in your family have a serious bleeding problem such as prolonged bleeding following surgeries or cuts? No   9. Have you ever had problems with anemia or been told to take iron pills? No   10. Have you had any abnormal blood loss such as black, tarry or bloody stools? No   11. Have you ever had a blood transfusion? No   12. Are you willing to have a blood transfusion if it is medically needed before, during, or after your surgery? Yes   13. Have you or any of your relatives ever had problems with anesthesia? No   14. Do you have sleep apnea, excessive snoring or daytime drowsiness? No   15. Do you have any artifical heart valves or other implanted medical devices like a pacemaker, defibrillator, or continuous glucose monitor? No   16. Do you have artificial joints? No   17. Are you allergic to latex? No       Health Care Directive:  Patient does not have a Health Care Directive or Living Will:     Preoperative Review of :   reviewed - controlled substances reflected in medication list.      Status of Chronic Conditions:  See problem list for active medical problems.  Problems all longstanding and stable, except as noted/documented.  See ROS for pertinent symptoms related to these conditions.      Review of Systems  CONSTITUTIONAL: NEGATIVE for fever, chills, change in weight  INTEGUMENTARY/SKIN: NEGATIVE for worrisome rashes, moles or lesions  EYES: NEGATIVE for vision changes or irritation  ENT/MOUTH: NEGATIVE for ear, mouth and throat problems  RESP: NEGATIVE for significant cough or SOB  BREAST: NEGATIVE for masses, tenderness or discharge  CV: NEGATIVE for chest pain, palpitations or peripheral edema  GI: NEGATIVE for nausea, abdominal pain, heartburn, or change in bowel habits  : NEGATIVE for frequency, dysuria, or hematuria  MUSCULOSKELETAL: NEGATIVE for significant arthralgias or myalgia  NEURO: NEGATIVE for weakness, dizziness or paresthesias  ENDOCRINE: NEGATIVE for temperature  intolerance, skin/hair changes  HEME: NEGATIVE for bleeding problems  PSYCHIATRIC: NEGATIVE for changes in mood or affect    Patient Active Problem List    Diagnosis Date Noted     STILL'S MURMUR 05/10/2007     Priority: Medium     Disorder of male genital organs 03/31/2006     Priority: Medium     Problem list name updated by automated process. Provider to review       Keratosis Pilaris 04/13/2005     Priority: Medium      Past Medical History:   Diagnosis Date     Epididymitis      Past Surgical History:   Procedure Laterality Date     HC EXCISE VARICOCELE  5/07     No current outpatient medications on file.       No Known Allergies     Social History     Tobacco Use     Smoking status: Never Smoker     Smokeless tobacco: Never Used   Substance Use Topics     Alcohol use: Yes     Comment: social     Family History   Problem Relation Age of Onset     Cancer Paternal Grandmother         BACK     Cancer Paternal Grandfather         TESTICULAR     Thyroid Disease Maternal Grandmother         hypothyroid     Family History Negative Mother         Born 1959     Family History Negative Father         Born 1962     Family History Negative Brother         1     Family History Negative Sister         1     History   Drug Use No         Objective     /64   Pulse 60   Temp 97.4  F (36.3  C) (Tympanic)   Resp 16   Wt 72.6 kg (160 lb)   SpO2 99%   BMI 23.63 kg/m      Physical Exam    GENERAL APPEARANCE: healthy, alert and no distress     EYES: EOMI,  PERRL     HENT: ear canals and TM's normal and nose and mouth without ulcers or lesions     NECK: no adenopathy, no asymmetry, masses, or scars and thyroid normal to palpation     RESP: lungs clear to auscultation - no rales, rhonchi or wheezes     CV: regular rates and rhythm, normal S1 S2, no S3 or S4 and no murmur, click or rub     ABDOMEN:  soft, nontender, no HSM or masses and bowel sounds normal     MS: left extremity TTP     SKIN: no suspicious lesions or  rashes     NEURO: Normal strength and tone, sensory exam grossly normal, mentation intact and speech normal     PSYCH: mentation appears normal. and affect normal/bright     LYMPHATICS: No cervical adenopathy    No results for input(s): HGB, PLT, INR, NA, POTASSIUM, CR, A1C in the last 76369 hours.     Diagnostics:  No labs were ordered during this visit.   No EKG required, no history of coronary heart disease, significant arrhythmia, peripheral arterial disease or other structural heart disease.    Revised Cardiac Risk Index (RCRI):  The patient has the following serious cardiovascular risks for perioperative complications:   - No serious cardiac risks = 0 points     RCRI Interpretation: 0 points: Class I (very low risk - 0.4% complication rate)           Signed Electronically by: Ramona Ann Aaseby-Aguilera, PA-C  Copy of this evaluation report is provided to requesting physician.

## 2021-11-30 ENCOUNTER — E-VISIT (OUTPATIENT)
Dept: INTERNAL MEDICINE | Facility: CLINIC | Age: 31
End: 2021-11-30

## 2021-11-30 DIAGNOSIS — L50.9 HIVES: Primary | ICD-10-CM

## 2021-11-30 PROCEDURE — 99421 OL DIG E/M SVC 5-10 MIN: CPT | Performed by: PHYSICIAN ASSISTANT

## 2021-12-01 RX ORDER — CETIRIZINE HYDROCHLORIDE 10 MG/1
10 TABLET ORAL DAILY
Qty: 30 TABLET | Refills: 0 | Status: SHIPPED | OUTPATIENT
Start: 2021-12-01 | End: 2022-05-18

## 2021-12-01 RX ORDER — TRIAMCINOLONE ACETONIDE 1 MG/G
CREAM TOPICAL 2 TIMES DAILY
Qty: 30 G | Refills: 0 | Status: SHIPPED | OUTPATIENT
Start: 2021-12-01 | End: 2022-04-21

## 2022-04-14 ENCOUNTER — MYC MEDICAL ADVICE (OUTPATIENT)
Dept: FAMILY MEDICINE | Facility: CLINIC | Age: 32
End: 2022-04-14
Payer: COMMERCIAL

## 2022-04-21 ENCOUNTER — OFFICE VISIT (OUTPATIENT)
Dept: FAMILY MEDICINE | Facility: CLINIC | Age: 32
End: 2022-04-21
Payer: COMMERCIAL

## 2022-04-21 VITALS
HEART RATE: 60 BPM | WEIGHT: 177 LBS | RESPIRATION RATE: 16 BRPM | BODY MASS INDEX: 26.22 KG/M2 | TEMPERATURE: 97.7 F | SYSTOLIC BLOOD PRESSURE: 110 MMHG | DIASTOLIC BLOOD PRESSURE: 64 MMHG | OXYGEN SATURATION: 98 % | HEIGHT: 69 IN

## 2022-04-21 DIAGNOSIS — L50.9 HIVES: ICD-10-CM

## 2022-04-21 PROCEDURE — 99213 OFFICE O/P EST LOW 20 MIN: CPT | Performed by: FAMILY MEDICINE

## 2022-04-21 RX ORDER — TRIAMCINOLONE ACETONIDE 1 MG/G
CREAM TOPICAL 2 TIMES DAILY
Qty: 80 G | Refills: 1 | Status: SHIPPED | OUTPATIENT
Start: 2022-04-21 | End: 2024-04-16

## 2022-04-21 RX ORDER — MONTELUKAST SODIUM 10 MG/1
10 TABLET ORAL AT BEDTIME
Qty: 90 TABLET | Refills: 1 | Status: SHIPPED | OUTPATIENT
Start: 2022-04-21 | End: 2022-05-18

## 2022-04-21 ASSESSMENT — ENCOUNTER SYMPTOMS
COLOR CHANGE: 0
FEVER: 0
DYSURIA: 0
AGITATION: 0
COUGH: 0
HEADACHES: 0

## 2022-04-21 NOTE — PROGRESS NOTES
"  Assessment & Plan     Hives  - triamcinolone (KENALOG) 0.1 % external cream; Apply topically 2 times daily  - montelukast (SINGULAIR) 10 MG tablet; Take 1 tablet (10 mg) by mouth At Bedtime    Discussed etiology of hives ,  Discussed allergy consult .  Discussed Claritin in am , discussed benadryl and Pepcid .  Patient currently deferred allergy consult .  Recommend to contact with any new concern .     BMI:   Estimated body mass index is 26.14 kg/m  as calculated from the following:    Height as of this encounter: 1.753 m (5' 9\").    Weight as of this encounter: 80.3 kg (177 lb).   Weight management plan: Discussed healthy diet and exercise guidelines        Return in about 6 months (around 10/21/2022) for Routine preventive, patient will call.    Ivon Hill MD  [facility] TRISTEN Penn is a 31 year old who presents for the following health issues     Rash  Associated symptoms include a rash. Pertinent negatives include no congestion, coughing, fever or headaches.   History of Present Illness       Reason for visit:  Hives  Symptom onset:  3-4 weeks ago  Symptoms include:  Hives  Symptom intensity:  Mild  Symptom progression:  Staying the same  Had these symptoms before:  No  What makes it worse:  Sweat  What makes it better:  No    He eats 0-1 servings of fruits and vegetables daily.He consumes 1 sweetened beverage(s) daily.He exercises with enough effort to increase his heart rate 30 to 60 minutes per day.  He exercises with enough effort to increase his heart rate 6 days per week.   He is taking medications regularly.           Review of Systems   Constitutional: Negative for fever.   HENT: Negative for congestion.    Respiratory: Negative for cough.    Genitourinary: Negative for dysuria.   Skin: Positive for rash. Negative for color change.   Neurological: Negative for headaches.   Psychiatric/Behavioral: Negative for agitation.            Objective    /64 (BP Location: " "Right arm, Patient Position: Chair, Cuff Size: Adult Regular)   Pulse 60   Temp 97.7  F (36.5  C) (Oral)   Resp 16   Ht 1.753 m (5' 9\")   Wt 80.3 kg (177 lb)   SpO2 98%   BMI 26.14 kg/m    Body mass index is 26.14 kg/m .  Physical Exam  Pulmonary:      Effort: Pulmonary effort is normal.   Abdominal:      General: Abdomen is flat.   Skin:     General: Skin is warm.      Findings: Rash present.      Comments: Hives in the back and chest .   Psychiatric:         Mood and Affect: Mood normal.                "

## 2022-05-18 ENCOUNTER — TELEPHONE (OUTPATIENT)
Dept: FAMILY MEDICINE | Facility: CLINIC | Age: 32
End: 2022-05-18
Payer: COMMERCIAL

## 2022-05-18 DIAGNOSIS — L50.9 HIVES: ICD-10-CM

## 2022-05-18 RX ORDER — CETIRIZINE HYDROCHLORIDE 10 MG/1
10 TABLET ORAL DAILY
Qty: 30 TABLET | Refills: 0 | Status: SHIPPED | OUTPATIENT
Start: 2022-05-18 | End: 2024-04-16

## 2022-05-18 RX ORDER — MONTELUKAST SODIUM 10 MG/1
10 TABLET ORAL AT BEDTIME
Qty: 90 TABLET | Refills: 1 | Status: SHIPPED | OUTPATIENT
Start: 2022-05-18 | End: 2024-04-16

## 2022-05-18 NOTE — TELEPHONE ENCOUNTER
Called and spoke with pt, pt did not  Rx 4/21/22. Will send to new pharmacy.     Karli MENDEZ RN

## 2022-05-18 NOTE — TELEPHONE ENCOUNTER
Patient calling stating his insurance no longer covers at [facility], he needs all medication to be sent to [facility] in [place] on [address]. Pharmacy pended.  Anne Marie Levin,

## 2022-10-02 ENCOUNTER — OFFICE VISIT (OUTPATIENT)
Dept: URGENT CARE | Facility: URGENT CARE | Age: 32
End: 2022-10-02
Payer: COMMERCIAL

## 2022-10-02 VITALS
SYSTOLIC BLOOD PRESSURE: 127 MMHG | BODY MASS INDEX: 25.1 KG/M2 | RESPIRATION RATE: 20 BRPM | TEMPERATURE: 98.5 F | HEART RATE: 141 BPM | WEIGHT: 170 LBS | DIASTOLIC BLOOD PRESSURE: 79 MMHG | OXYGEN SATURATION: 98 %

## 2022-10-02 DIAGNOSIS — B02.9 HERPES ZOSTER WITHOUT COMPLICATION: Primary | ICD-10-CM

## 2022-10-02 PROCEDURE — 99213 OFFICE O/P EST LOW 20 MIN: CPT | Performed by: FAMILY MEDICINE

## 2022-10-02 RX ORDER — VALACYCLOVIR HYDROCHLORIDE 1 G/1
1000 TABLET, FILM COATED ORAL 3 TIMES DAILY
Qty: 30 TABLET | Refills: 1 | Status: SHIPPED | OUTPATIENT
Start: 2022-10-02 | End: 2022-10-12

## 2022-10-02 NOTE — PROGRESS NOTES
"    ASSESSMENT/  PLAN:  Herpes zoster without complication     - valACYclovir (VALTREX) 1000 mg tablet; Take 1 tablet (1,000 mg) by mouth 3 times daily for 10 days        We discussed that the antiviral medications can decrease the severity of the zoster attack and reduce the risk of post -herpetic neuralgia when started promptly at the start of zoster symptoms, and that a delay in starting treatment produces less favorable results.  .  I advised the patient that zoster is chicken pox virus, and that  unvaccinated small children, pregnant women and adults who have not had chicken pox should be avoided until all skin lesions have dried and scabbed over  to prevent transmission of the disease.      Follow-up with primary clinic if not improving     May take acetaminophen / ibuprofen as an alternative for pain    He declined viral testing, or monkey pox testing    ---------------------------------------------------------------------------------------------------------------------------------------------------------------------    SUBJECTIVE:  Chief Complaint   Patient presents with     Derm Problem     Rash on Left buttock area that is painful and \"burning sensation\" to it- couple days     Fili HUSAM Love is a 32 year old male  who presents to the clinic today for a  painful area of skin with  rash.  Onset   was 2 day(s) ago and  is sudden onset, still present and constant.    Location  : left buttocks.  Quality/symptoms : burning, painful, red and blistering   Symptoms are moderate and the affected skin seems to be worsening.  Previous history of a similar symptoms? No  Recent exposure history: none known -  No plant exposure-  Declines monkey pox testing    Associated symptoms include: nothing.    Past Medical History:   Diagnosis Date     Epididymitis      Patient Active Problem List   Diagnosis     Keratosis Pilaris     Disorder of male genital organs     STILL'S MURMUR       ALLERGIES:  Patient has no known " allergies.    MEDs  cetirizine (ZYRTEC) 10 MG tablet, Take 1 tablet (10 mg) by mouth daily  montelukast (SINGULAIR) 10 MG tablet, Take 1 tablet (10 mg) by mouth At Bedtime  triamcinolone (KENALOG) 0.1 % external cream, Apply topically 2 times daily    No current facility-administered medications on file prior to visit.      Social History     Tobacco Use     Smoking status: Never Smoker     Smokeless tobacco: Never Used   Substance Use Topics     Alcohol use: Yes     Comment: social       Family History   Problem Relation Age of Onset     Cancer Paternal Grandmother         BACK     Cancer Paternal Grandfather         TESTICULAR     Thyroid Disease Maternal Grandmother         hypothyroid     Family History Negative Mother         Born 1959     Family History Negative Father         Born 1962     Family History Negative Brother         1     Family History Negative Sister         1         ROS:  CONSTITUTIONAL:NEGATIVE for fever, chills, change in weight  EYES: NEGATIVE for vision changes or irritation  ENT/MOUTH: NEGATIVE for ear, mouth and throat problems  RESP:NEGATIVE for significant cough or SOB    EXAM:   /79   Pulse (!) 141   Temp 98.5  F (36.9  C)   Resp 20   Wt 77.1 kg (170 lb)   SpO2 98%   BMI 25.10 kg/m    GENERAL: alert, mild distress.  SKIN: Rash description:    Distribution: localized  Location:  left buttocks    Color: red,  Lesion type: vesicular, blotchy with swelling and inflammation      EYES: EOMI,  PERRL, conjunctiva clear  NECK: supple, non-tender to palpation, no adenopathy noted  ,RESP: lungs clear to auscultation - no rales, rhonchi or wheezes  CV: regular rates and rhythm, normal S1 S2, no murmur noted  MS:extremities normal- no gross deformities noted,  FROM noted in all extremities  NEURO: Normal strength and tone, sensory exam grossly normal,  normal speech and mentation

## 2024-01-11 ENCOUNTER — HOSPITAL ENCOUNTER (EMERGENCY)
Facility: CLINIC | Age: 34
Discharge: HOME | End: 2024-01-11
Attending: EMERGENCY MEDICINE | Admitting: EMERGENCY MEDICINE
Payer: COMMERCIAL

## 2024-01-11 VITALS
TEMPERATURE: 97.4 F | WEIGHT: 165 LBS | HEART RATE: 73 BPM | SYSTOLIC BLOOD PRESSURE: 102 MMHG | HEIGHT: 69 IN | OXYGEN SATURATION: 100 % | RESPIRATION RATE: 13 BRPM | DIASTOLIC BLOOD PRESSURE: 69 MMHG | BODY MASS INDEX: 24.44 KG/M2

## 2024-01-11 DIAGNOSIS — R55 VASOVAGAL SYNCOPE: ICD-10-CM

## 2024-01-11 LAB
ATRIAL RATE - MUSE: 59 BPM
DIASTOLIC BLOOD PRESSURE - MUSE: NORMAL MMHG
INTERPRETATION ECG - MUSE: NORMAL
P AXIS - MUSE: 64 DEGREES
PR INTERVAL - MUSE: 168 MS
QRS DURATION - MUSE: 96 MS
QT - MUSE: 398 MS
QTC - MUSE: 394 MS
R AXIS - MUSE: 43 DEGREES
SYSTOLIC BLOOD PRESSURE - MUSE: NORMAL MMHG
T AXIS - MUSE: 46 DEGREES
VENTRICULAR RATE- MUSE: 59 BPM

## 2024-01-11 PROCEDURE — 93005 ELECTROCARDIOGRAM TRACING: CPT

## 2024-01-11 PROCEDURE — 99283 EMERGENCY DEPT VISIT LOW MDM: CPT

## 2024-01-11 NOTE — ED TRIAGE NOTES
Pt presents to the ED with lightheadedness after having an episode of syncope at 0940. Pt was sitting down and did not fall. Pt lost consciousness for 1 minute and had some grunting and involuntary movement before coming to. Pt was at the [facility] with wife who was getting an US at the time. Pt reports a history of passing out during various medical related situations.     Triage Assessment (Adult)       Row Name 01/11/24 0956          Triage Assessment    Airway WDL WDL        Respiratory WDL    Respiratory WDL WDL        Skin Circulation/Temperature WDL    Skin Circulation/Temperature WDL WDL        Cardiac WDL    Cardiac WDL X  lightheaded        Peripheral/Neurovascular WDL    Peripheral Neurovascular WDL WDL        Cognitive/Neuro/Behavioral WDL    Cognitive/Neuro/Behavioral WDL WDL

## 2024-01-11 NOTE — ED PROVIDER NOTES
"  History     Chief Complaint:  Syncope     HPI   Fili Love is a 33 year old male who presents to the ED for evaluation after a syncopal episode today. Patient states that he was at his wife's OBGYN fetal ultrasound appointment when the provider started talking about a cyst and he became nervous. He then started feeling lightheaded and warm before passing out completely. He was sitting in a chair at the time and did not injure himself. Patient states that he has had similar episodes like this before when he is in medical situations. His wife expresses concerns that this episode lasted longer than previous episodes. She also notes that he had involuntary movements and grunting during today's episode, which he has not had before. Fili says he could feel himself moving while he was passed out. He states that he had some diaphoresis after the episode but feels normal now and denies any pain. He did not experienced a headache or chest pain prior to the syncopal episode. He adds that he did not eat anything this morning, which was the same as previous episodes. He also notes that he has been battling a cold for a while and has been taking a steroid nasal spray for this concern. Patient does not have any current concerns about anemia. He denies feeling dizzy when changing positions and any black/bloody stools. He also exercises regularly.     Independent Historian:   Spouse/Partner - They report additional history as noted above.    Review of External Notes:   none     Medications:    Zyrtec  Singulair     Past Medical History:    Epididymitis   Keratosis pilaris   Still's murmur     Past Surgical History:    Excise varicocele      Physical Exam   Patient Vitals for the past 24 hrs:   BP Temp Temp src Pulse Resp SpO2 Height Weight   01/11/24 1026 -- -- -- 62 12 100 % -- --   01/11/24 0958 102/69 97.4  F (36.3  C) Oral 56 16 100 % 1.753 m (5' 9\") 74.8 kg (165 lb)        Physical Exam  Nursing note and vitals " reviewed.  HENT:   Mouth/Throat: Moist mucous membranes.   Eyes: EOMI, nonicteric sclera  Cardiovascular: Normal rate, regular rhythm, no murmurs, rubs, or gallops  Pulmonary/Chest: Effort normal and breath sounds normal. No respiratory distress. No wheezes. No rales.   Abdominal: Soft. Nontender, nondistended, no guarding or rigidity.   Musculoskeletal: Normal range of motion.   Neurological: Alert. Moves all extremities spontaneously.   Skin: Skin is warm and dry. No rash noted.         Emergency Department Course   ECG  ECG taken at 1025, ECG read at 1037  Sinus bradycardia  Otherwise normal ECG   No significant change as compared to prior, dated 1/16/18.  Rate 59 bpm. ME interval 168 ms. QRS duration 96 ms. QT/QTc 398/394 ms. P-R-T axes 64 43 46.     Emergency Department Course & Assessments:    Assessments:  1006 I obtained history and examined the patient as noted above.    Social Determinants of Health affecting care:   None    Disposition:  The patient was discharged to home.     Impression & Plan        Medical Decision Making:    Pt presents with CC syncope. The differential for syncope is concerning for cardiac arrythmia, ACS, aortic stenosis, HOCM, PE, orthostatic hypotension, drugs, situational, carotid hypersensitivity, seizure, TIA, stroke.  There is no family history of sudden death, no chest pain, no post-ictal period, no murmur, and no signs of orthostasis in the ED, no focal neurologic symptoms, and no complaints of concerning headache.  The patient did not have prolonged LOC, sleepiness, repeated emesis, poor orientation, or significant irritability.  EKG is normal without any evidence of arrhythmia.  Discussed with patient and wife that this is classic vasovagal syncope and no further workup is indicated at this time.  We discussed strategies to avoid future episodes. He is in stable condition at the time of discharge, red flags that should merit ED return were discussed as well as recommended  follow-up instructions. All questions were answered and he is in agreement with the plan.        Diagnosis:    ICD-10-CM    1. Vasovagal syncope  R55            Scribe Disclosure:  I, Jennifer Carrillo, am serving as a scribe at 9:58 AM on 1/11/2024 to document services personally performed by Carlos Mejia MD based on my observations and the provider's statements to me.          Carlos Mejia MD  01/11/24 8621

## 2024-04-16 ENCOUNTER — OFFICE VISIT (OUTPATIENT)
Dept: FAMILY MEDICINE | Facility: CLINIC | Age: 34
End: 2024-04-16
Payer: COMMERCIAL

## 2024-04-16 VITALS
RESPIRATION RATE: 18 BRPM | HEART RATE: 54 BPM | DIASTOLIC BLOOD PRESSURE: 74 MMHG | HEIGHT: 68 IN | BODY MASS INDEX: 25.52 KG/M2 | WEIGHT: 168.4 LBS | TEMPERATURE: 98.7 F | SYSTOLIC BLOOD PRESSURE: 119 MMHG | OXYGEN SATURATION: 98 %

## 2024-04-16 DIAGNOSIS — N50.9 DISORDER OF MALE GENITAL ORGANS: Primary | ICD-10-CM

## 2024-04-16 DIAGNOSIS — N50.819 CHRONIC PAIN IN TESTICLE: ICD-10-CM

## 2024-04-16 DIAGNOSIS — G89.29 CHRONIC PAIN IN TESTICLE: ICD-10-CM

## 2024-04-16 PROCEDURE — 99213 OFFICE O/P EST LOW 20 MIN: CPT | Performed by: PHYSICIAN ASSISTANT

## 2024-04-16 ASSESSMENT — PAIN SCALES - GENERAL: PAINLEVEL: NO PAIN (0)

## 2024-04-16 NOTE — PROGRESS NOTES
"  Assessment & Plan   ? Varococele vs? Will get US and refer to urology.     Disorder of male genital organs    - US Testicular & Scrotum w Doppler Ltd; Future  - Adult Urology  Referral; Future    Chronic pain in testicle    - US Testicular & Scrotum w Doppler Ltd; Future  - Adult Urology  Referral; Future      BMI  Estimated body mass index is 25.99 kg/m  as calculated from the following:    Height as of this encounter: 1.715 m (5' 7.5\").    Weight as of this encounter: 76.4 kg (168 lb 6.4 oz).             Hilda Penn is a 33 year old, presenting for the following health issues:  Groin Pain (Had surgery as a teenager, blew his nose and felt pressure build up in his left groin area, pain every now and then, seems to be improving.)      4/16/2024     2:14 PM   Additional Questions   Roomed by Julio Cesar BEE - Student MA   Accompanied by Self         Fili states he has had a history of varicocele and repair when younger. Now for the past few months he has had a achy discomfort at bottom of left testicle. He deneis any STD risks and no dysuria.   History of Present Illness       Reason for visit:  Discomfort    He eats 0-1 servings of fruits and vegetables daily.He consumes 1 sweetened beverage(s) daily.He exercises with enough effort to increase his heart rate 30 to 60 minutes per day.  He exercises with enough effort to increase his heart rate 6 days per week.   He is taking medications regularly.               Review of Systems  CONSTITUTIONAL: NEGATIVE for fever, chills, change in weight  INTEGUMENTARY/SKIN: NEGATIVE for worrisome rashes, moles or lesions  EYES: NEGATIVE for vision changes or irritation  ENT/MOUTH: NEGATIVE for ear, mouth and throat problems  RESP: NEGATIVE for significant cough or SOB  BREAST: NEGATIVE for masses, tenderness or discharge  CV: NEGATIVE for chest pain, palpitations or peripheral edema  GI: NEGATIVE for nausea, abdominal pain, heartburn, or change in bowel " "habits   male :negative for dysuria, hematuria, decreased urinary stream, erectile dysfunction and testicular discomfort on left  MUSCULOSKELETAL: NEGATIVE for significant arthralgias or myalgia  NEURO: NEGATIVE for weakness, dizziness or paresthesias  ENDOCRINE: NEGATIVE for temperature intolerance, skin/hair changes  HEME: NEGATIVE for bleeding problems  PSYCHIATRIC: NEGATIVE for changes in mood or affect      Objective    /74 (BP Location: Right arm, Patient Position: Right side, Cuff Size: Adult Large)   Pulse 54   Temp 98.7  F (37.1  C) (Oral)   Resp 18   Ht 1.715 m (5' 7.5\")   Wt 76.4 kg (168 lb 6.4 oz)   SpO2 98%   BMI 25.99 kg/m    Body mass index is 25.99 kg/m .  Physical Exam   GENERAL: alert and no distress  RESP: lungs clear to auscultation - no rales, rhonchi or wheezes  CV: regular rate and rhythm, normal S1 S2, no S3 or S4, no murmur, click or rub, no peripheral edema    (male): testicles normal without atrophy or masses, no hernias, and penis normal without urethral discharge            Signed Electronically by: Ramona Ann Aaseby-Aguilera, PA-C    "

## 2024-05-06 ENCOUNTER — HOSPITAL ENCOUNTER (OUTPATIENT)
Dept: ULTRASOUND IMAGING | Facility: CLINIC | Age: 34
Discharge: HOME | End: 2024-05-06
Attending: PHYSICIAN ASSISTANT | Admitting: PHYSICIAN ASSISTANT
Payer: COMMERCIAL

## 2024-05-06 DIAGNOSIS — G89.29 CHRONIC PAIN IN TESTICLE: ICD-10-CM

## 2024-05-06 DIAGNOSIS — N50.9 DISORDER OF MALE GENITAL ORGANS: ICD-10-CM

## 2024-05-06 DIAGNOSIS — N50.819 CHRONIC PAIN IN TESTICLE: ICD-10-CM

## 2024-05-06 PROCEDURE — 93976 VASCULAR STUDY: CPT

## 2024-05-06 PROCEDURE — 76870 US EXAM SCROTUM: CPT

## 2025-01-30 ENCOUNTER — OFFICE VISIT (OUTPATIENT)
Dept: UROLOGY | Facility: CLINIC | Age: 35
End: 2025-01-30
Payer: COMMERCIAL

## 2025-01-30 VITALS — SYSTOLIC BLOOD PRESSURE: 144 MMHG | DIASTOLIC BLOOD PRESSURE: 74 MMHG

## 2025-01-30 DIAGNOSIS — N50.9 DISORDER OF MALE GENITAL ORGANS: ICD-10-CM

## 2025-01-30 DIAGNOSIS — N50.819 CHRONIC PAIN IN TESTICLE: ICD-10-CM

## 2025-01-30 DIAGNOSIS — G89.29 CHRONIC PAIN IN TESTICLE: ICD-10-CM

## 2025-01-30 LAB
ALBUMIN UR-MCNC: NEGATIVE MG/DL
APPEARANCE UR: CLEAR
BILIRUB UR QL STRIP: NEGATIVE
COLOR UR AUTO: YELLOW
GLUCOSE UR STRIP-MCNC: NEGATIVE MG/DL
HGB UR QL STRIP: NEGATIVE
KETONES UR STRIP-MCNC: NEGATIVE MG/DL
LEUKOCYTE ESTERASE UR QL STRIP: NEGATIVE
NITRATE UR QL: NEGATIVE
PH UR STRIP: 7 [PH] (ref 5–7)
SP GR UR STRIP: 1.01 (ref 1–1.03)
UROBILINOGEN UR STRIP-ACNC: 0.2 E.U./DL

## 2025-01-30 ASSESSMENT — PAIN SCALES - GENERAL: PAINLEVEL_OUTOF10: NO PAIN (0)

## 2025-01-30 NOTE — NURSING NOTE
"Chief Complaint   Patient presents with    Testicular/scrotal Pain     Pt describes it as \"discomfort\" in the left testicle for a little over year.  No known injury.  No current lumps he has noticed.    Past hx of varicocele sx as a child    Pt has no concerns with urination.  Pt denies gross hematuria or dysuria.    Regina Goff, Clinic Assistant    "

## 2025-01-30 NOTE — LETTER
1/30/2025       RE: Fili Love  [address] [facility] [address]     Dear Colleague,    Thank you for referring your patient, Fili Love, to the [facility] [place] at [facility]. Please see a copy of my visit note below.          Chief Complaint:   Left testicular pain           Consult or Referral:     Mr. Fili Love is a 34 year old male seen at the request of Dr. Aaseby-Aguilera.         History of Present Illness:     Fili Love is a 34 year old male being seen for testicular  pain.  Duration of problem: Few years  Previous treatments:  varicocelectomy as a teenager      Reviewed previous notes from Dr. Jeni Hathaway     Has been having persistent discomfort in the left testicle more than the right  Worse towards the evening  Worse during the blank  History of varicocelectomy bilateral as a teenager  Has been evaluated for a hernia in the past               Past Medical History:     Past Medical History:   Diagnosis Date     Epididymitis             Past Surgical History:     Past Surgical History:   Procedure Laterality Date     HC EXCISE VARICOCELE  5/07            Medications     Current Outpatient Medications   Medication Sig Dispense Refill     valACYclovir (VALTREX) 1000 mg tablet Take 1 tablet (1,000 mg) by mouth 3 times daily for 10 days 30 tablet 1     No current facility-administered medications for this visit.            Family History:     Family History   Problem Relation Age of Onset     Cancer Paternal Grandmother         BACK     Cancer Paternal Grandfather         TESTICULAR     Thyroid Disease Maternal Grandmother         hypothyroid     Family History Negative Mother         Born 1959     Family History Negative Father         Born 1962     Family History Negative Brother         1     Family History Negative Sister         1            Social History:     Social History      Socioeconomic History     Marital status:      Spouse name: Not on file     Number of children: Not on file     Years of education: Not on file     Highest education level: Not on file   Occupational History     Employer: STUDENT   Tobacco Use     Smoking status: Never     Smokeless tobacco: Never   Vaping Use     Vaping status: Never Used   Substance and Sexual Activity     Alcohol use: Yes     Comment: social     Drug use: No     Sexual activity: Yes     Partners: Female     Birth control/protection: Condom     Comment: using condoms all the time   Other Topics Concern     Parent/sibling w/ CABG, MI or angioplasty before 65F 55M? No   Social History Narrative     Not on file     Social Drivers of Health     Financial Resource Strain: Not on file   Food Insecurity: Not on file   Transportation Needs: Not on file   Physical Activity: Not on file   Stress: Not on file   Social Connections: Not on file   Interpersonal Safety: Low Risk  (4/16/2024)    Interpersonal Safety      Do you feel physically and emotionally safe where you currently live?: Yes      Within the past 12 months, have you been hit, slapped, kicked or otherwise physically hurt by someone?: No      Within the past 12 months, have you been humiliated or emotionally abused in other ways by your partner or ex-partner?: No   Housing Stability: Not on file            Allergies:   Patient has no known allergies.         Review of Systems:  From intake questionnaire     Skin: negative  Eyes: negative  Ears/Nose/Throat: negative  Respiratory: No shortness of breath, dyspnea on exertion, cough, or hemoptysis  Cardiovascular: No chest pain or palpitations  Gastrointestinal: negative; no nausea/vomiting, constipation or diarrhea  Genitourinary: as per HPI  Musculoskeletal: negative  Neurologic: negative  Psychiatric: negative  Hematologic/Lymphatic/Immunologic: negative  Endocrine: negative         Physical Exam:     Patient is a 34 year old  male  "  Vitals: Blood pressure (!) 144/74.  Constitutional: There is no height or weight on file to calculate BMI.  Alert, no acute distress, oriented, conversant  Eyes: no scleral icterus; extraocular muscles intact, moist conjunctivae  Neck: trachea midline, no thyromegaly  Ears/nose/mouth: throat/mouth:normal, good dentition  Respiratory: no respiratory distress, or pursed lip breathing  Cardiovascular: pulses strong and intact; no obvious jugular venous distension present  Gastrointestinal: soft, nontender, no organomegaly or masses,   Lymphatics: No inguinal adenopathy  Musculoskeletal: extremities normal, no peripheral edema  Skin: no suspicious lesions or rashes  Neuro: Alert, oriented, speech and mentation normal  Psych: affect and mood normal, alert and oriented to person, place and time  Gait: Normal  : Normal-appearing external genitalia, no evidence of clinical varicocele, cord feels joseph      Labs and Pathology:    The following labs were reviewed by me and discussed with the patient:  Component      Latest Ref Rng 1/30/2025  1:00 PM   Color Urine      Colorless, Straw, Light Yellow, Yellow  Yellow    Appearance Urine      Clear  Clear    Glucose Urine      Negative mg/dL Negative    Bilirubin Urine      Negative  Negative    Ketones Urine      Negative mg/dL Negative    Specific Gravity Urine      1.003 - 1.035  1.015    Blood Urine      Negative  Negative    pH Urine      5.0 - 7.0  7.0    Protein Albumin Urine      Negative mg/dL Negative    Urobilinogen Urine      0.2, 1.0 E.U./dL 0.2    Nitrite Urine      Negative  Negative    Leukocyte Esterase Urine      Negative  Negative        Significant for   Lab Results   Component Value Date    CR 1.00 08/01/2011    CR 0.96 07/20/2011     No results found for: \"PSA\"          Imaging:    The following imaging exams were independently viewed and interpreted by me and discussed with patient:  Scrotal Ultrasound: EXAM: US TESTICULAR AND SCROTUM WITH DOPPLER " LIMITED  LOCATION: [facility]  DATE: 5/6/2024     INDICATION:  Disorder of male genital organs, Chronic pain in testicle, Chronic pain in testicle  COMPARISON: None.  TECHNIQUE: Ultrasound of scrotum with color flow and spectral Doppler with waveform analysis performed.     FINDINGS:     RIGHT: Right testicle measures 5.0 x 3.9 x 2.5 cm. Normal testicle with no masses. Normal arterial duplex and normal color flow. Normal epididymis. Small hydrocele. No varicocele.     LEFT: Left testicle measures 4.3 x 3.1 x 2.3 cm. Normal testicle with no masses. Normal arterial duplex and normal color flow. 2 x 3 x 3 mm cyst in the head of the left epididymis. Otherwise, Normal epididymis. Small hydrocele. No varicocele.                                                                      IMPRESSION:  1.  Tiny benign cyst in the head of the left epididymis.  2.  Small bilateral hydroceles.  3.  Normal testicles.               Assessment and Plan:     Disorder of male genital organs  Chronic pain in testicle  Unsure as to the etiology of the testicular pain  Need to differentiate between regard to varicocele versus hernia  Recommended to have repeat of the testicular ultrasound, additional evaluation for a possible left inguinal hernia  Did discuss with him that if ultrasound comes back normal or if there is a significant concern requiring intervention I might recommend him to one of my andrology partners options of care and management  He agrees  Will update him once the ultrasound results  - Adult Urology  Referral  - UA without Microscopic [[id number]]; Future  - UA without Microscopic [[id number]]  - US Testicular & Scrotum w Doppler Ltd; Future      Plan:  Repeat ultrasound    Orders  Orders Placed This Encounter   Procedures     US Testicular & Scrotum w Doppler Ltd     UA without Microscopic [[id number]]       Valentin Tai MD  [facility]  [place]      ==========================    Additional Billing and Coding Information:  Review of external notes as documented above   Review of the result(s) of each unique test - UA, creatinine, scrotal ultrasound                12 minutes spent by me on the date of the encounter doing chart review, review of test results, interpretation of tests, patient visit, and documentation     ==========================      Again, thank you for allowing me to participate in the care of your patient.      Sincerely,    Valentin Tai MD

## 2025-01-30 NOTE — PATIENT INSTRUCTIONS
Continue to wear supportive undergarments  US scrotum and testicles to be done  Will update on mychart

## 2025-01-30 NOTE — PROGRESS NOTES
Chief Complaint:   Left testicular pain           Consult or Referral:     Mr. Fili Love is a 34 year old male seen at the request of Dr. Aaseby-Aguilera.         History of Present Illness:     Fili Love is a 34 year old male being seen for testicular  pain.  Duration of problem: Few years  Previous treatments:  varicocelectomy as a teenager      Reviewed previous notes from Dr. Jeni Hathaway     Has been having persistent discomfort in the left testicle more than the right  Worse towards the evening  Worse during the blank  History of varicocelectomy bilateral as a teenager  Has been evaluated for a hernia in the past               Past Medical History:     Past Medical History:   Diagnosis Date    Epididymitis             Past Surgical History:     Past Surgical History:   Procedure Laterality Date    HC EXCISE VARICOCELE  5/07            Medications     Current Outpatient Medications   Medication Sig Dispense Refill    valACYclovir (VALTREX) 1000 mg tablet Take 1 tablet (1,000 mg) by mouth 3 times daily for 10 days 30 tablet 1     No current facility-administered medications for this visit.            Family History:     Family History   Problem Relation Age of Onset    Cancer Paternal Grandmother         BACK    Cancer Paternal Grandfather         TESTICULAR    Thyroid Disease Maternal Grandmother         hypothyroid    Family History Negative Mother         Born 1959    Family History Negative Father         Born 1962    Family History Negative Brother         1    Family History Negative Sister         1            Social History:     Social History     Socioeconomic History    Marital status:      Spouse name: Not on file    Number of children: Not on file    Years of education: Not on file    Highest education level: Not on file   Occupational History     Employer: STUDENT   Tobacco Use    Smoking status: Never    Smokeless tobacco: Never   Vaping Use    Vaping status: Never  Used   Substance and Sexual Activity    Alcohol use: Yes     Comment: social    Drug use: No    Sexual activity: Yes     Partners: Female     Birth control/protection: Condom     Comment: using condoms all the time   Other Topics Concern    Parent/sibling w/ CABG, MI or angioplasty before 65F 55M? No   Social History Narrative    Not on file     Social Drivers of Health     Financial Resource Strain: Not on file   Food Insecurity: Not on file   Transportation Needs: Not on file   Physical Activity: Not on file   Stress: Not on file   Social Connections: Not on file   Interpersonal Safety: Low Risk  (4/16/2024)    Interpersonal Safety     Do you feel physically and emotionally safe where you currently live?: Yes     Within the past 12 months, have you been hit, slapped, kicked or otherwise physically hurt by someone?: No     Within the past 12 months, have you been humiliated or emotionally abused in other ways by your partner or ex-partner?: No   Housing Stability: Not on file            Allergies:   Patient has no known allergies.         Review of Systems:  From intake questionnaire     Skin: negative  Eyes: negative  Ears/Nose/Throat: negative  Respiratory: No shortness of breath, dyspnea on exertion, cough, or hemoptysis  Cardiovascular: No chest pain or palpitations  Gastrointestinal: negative; no nausea/vomiting, constipation or diarrhea  Genitourinary: as per HPI  Musculoskeletal: negative  Neurologic: negative  Psychiatric: negative  Hematologic/Lymphatic/Immunologic: negative  Endocrine: negative         Physical Exam:     Patient is a 34 year old  male   Vitals: Blood pressure (!) 144/74.  Constitutional: There is no height or weight on file to calculate BMI.  Alert, no acute distress, oriented, conversant  Eyes: no scleral icterus; extraocular muscles intact, moist conjunctivae  Neck: trachea midline, no thyromegaly  Ears/nose/mouth: throat/mouth:normal, good dentition  Respiratory: no respiratory  "distress, or pursed lip breathing  Cardiovascular: pulses strong and intact; no obvious jugular venous distension present  Gastrointestinal: soft, nontender, no organomegaly or masses,   Lymphatics: No inguinal adenopathy  Musculoskeletal: extremities normal, no peripheral edema  Skin: no suspicious lesions or rashes  Neuro: Alert, oriented, speech and mentation normal  Psych: affect and mood normal, alert and oriented to person, place and time  Gait: Normal  : Normal-appearing external genitalia, no evidence of clinical varicocele, cord feels joseph      Labs and Pathology:    The following labs were reviewed by me and discussed with the patient:  Component      Latest Ref Rng 1/30/2025  1:00 PM   Color Urine      Colorless, Straw, Light Yellow, Yellow  Yellow    Appearance Urine      Clear  Clear    Glucose Urine      Negative mg/dL Negative    Bilirubin Urine      Negative  Negative    Ketones Urine      Negative mg/dL Negative    Specific Gravity Urine      1.003 - 1.035  1.015    Blood Urine      Negative  Negative    pH Urine      5.0 - 7.0  7.0    Protein Albumin Urine      Negative mg/dL Negative    Urobilinogen Urine      0.2, 1.0 E.U./dL 0.2    Nitrite Urine      Negative  Negative    Leukocyte Esterase Urine      Negative  Negative        Significant for   Lab Results   Component Value Date    CR 1.00 08/01/2011    CR 0.96 07/20/2011     No results found for: \"PSA\"          Imaging:    The following imaging exams were independently viewed and interpreted by me and discussed with patient:  Scrotal Ultrasound: EXAM: US TESTICULAR AND SCROTUM WITH DOPPLER LIMITED  LOCATION: [facility]  DATE: 5/6/2024     INDICATION:  Disorder of male genital organs, Chronic pain in testicle, Chronic pain in testicle  COMPARISON: None.  TECHNIQUE: Ultrasound of scrotum with color flow and spectral Doppler with waveform analysis performed.     FINDINGS:     RIGHT: Right testicle measures 5.0 x 3.9 x " 2.5 cm. Normal testicle with no masses. Normal arterial duplex and normal color flow. Normal epididymis. Small hydrocele. No varicocele.     LEFT: Left testicle measures 4.3 x 3.1 x 2.3 cm. Normal testicle with no masses. Normal arterial duplex and normal color flow. 2 x 3 x 3 mm cyst in the head of the left epididymis. Otherwise, Normal epididymis. Small hydrocele. No varicocele.                                                                      IMPRESSION:  1.  Tiny benign cyst in the head of the left epididymis.  2.  Small bilateral hydroceles.  3.  Normal testicles.               Assessment and Plan:     Disorder of male genital organs  Chronic pain in testicle  Unsure as to the etiology of the testicular pain  Need to differentiate between regard to varicocele versus hernia  Recommended to have repeat of the testicular ultrasound, additional evaluation for a possible left inguinal hernia  Did discuss with him that if ultrasound comes back normal or if there is a significant concern requiring intervention I might recommend him to one of my andrology partners options of care and management  He agrees  Will update him once the ultrasound results  - Adult Urology  Referral  - UA without Microscopic [[id number]]; Future  - UA without Microscopic [[id number]]  - US Testicular & Scrotum w Doppler Ltd; Future      Plan:  Repeat ultrasound    Orders  Orders Placed This Encounter   Procedures    US Testicular & Scrotum w Doppler Ltd    UA without Microscopic [[id number]]       Valentin Tai MD  [facility] [place]      ==========================    Additional Billing and Coding Information:  Review of external notes as documented above   Review of the result(s) of each unique test - UA, creatinine, scrotal ultrasound                12 minutes spent by me on the date of the encounter doing chart review, review of test results, interpretation of tests, patient visit, and documentation      ==========================

## 2025-03-03 ENCOUNTER — HOSPITAL ENCOUNTER (OUTPATIENT)
Dept: ULTRASOUND IMAGING | Facility: CLINIC | Age: 35
Discharge: HOME | End: 2025-03-03
Attending: STUDENT IN AN ORGANIZED HEALTH CARE EDUCATION/TRAINING PROGRAM | Admitting: STUDENT IN AN ORGANIZED HEALTH CARE EDUCATION/TRAINING PROGRAM
Payer: COMMERCIAL

## 2025-03-03 DIAGNOSIS — N50.9 DISORDER OF MALE GENITAL ORGANS: ICD-10-CM

## 2025-03-03 DIAGNOSIS — G89.29 CHRONIC PAIN IN TESTICLE: ICD-10-CM

## 2025-03-03 DIAGNOSIS — N50.819 CHRONIC PAIN IN TESTICLE: ICD-10-CM

## 2025-03-03 PROCEDURE — 93976 VASCULAR STUDY: CPT

## 2025-03-14 ENCOUNTER — MYC MEDICAL ADVICE (OUTPATIENT)
Dept: UROLOGY | Facility: CLINIC | Age: 35
End: 2025-03-14
Payer: COMMERCIAL

## 2025-03-18 NOTE — TELEPHONE ENCOUNTER
Left Voicemail (1st Attempt) for the patient to call back and schedule the following:    Appointment type: NEW  Provider: Scott or Lisa  Return date: Next available  Specialty phone number: [phone]  Additional appointment(s) needed: NA  Additonal Notes: NA

## 2025-03-19 ENCOUNTER — OFFICE VISIT (OUTPATIENT)
Dept: FAMILY MEDICINE | Facility: CLINIC | Age: 35
End: 2025-03-19
Payer: COMMERCIAL

## 2025-03-19 VITALS
WEIGHT: 168 LBS | OXYGEN SATURATION: 99 % | HEART RATE: 61 BPM | RESPIRATION RATE: 16 BRPM | SYSTOLIC BLOOD PRESSURE: 128 MMHG | HEIGHT: 68 IN | BODY MASS INDEX: 25.46 KG/M2 | TEMPERATURE: 98 F | DIASTOLIC BLOOD PRESSURE: 80 MMHG

## 2025-03-19 DIAGNOSIS — R20.2 HAND TINGLING: ICD-10-CM

## 2025-03-19 DIAGNOSIS — R41.840 IMPAIRED CONCENTRATION: Primary | ICD-10-CM

## 2025-03-19 DIAGNOSIS — R42 DIZZINESS: ICD-10-CM

## 2025-03-19 DIAGNOSIS — M43.6 NECK STIFFNESS: ICD-10-CM

## 2025-03-19 LAB
ERYTHROCYTE [DISTWIDTH] IN BLOOD BY AUTOMATED COUNT: 11.8 % (ref 10–15)
HCT VFR BLD AUTO: 44.4 % (ref 40–53)
HGB BLD-MCNC: 15.7 G/DL (ref 13.3–17.7)
MCH RBC QN AUTO: 29.9 PG (ref 26.5–33)
MCHC RBC AUTO-ENTMCNC: 35.4 G/DL (ref 31.5–36.5)
MCV RBC AUTO: 85 FL (ref 78–100)
PLATELET # BLD AUTO: 170 10E3/UL (ref 150–450)
RBC # BLD AUTO: 5.25 10E6/UL (ref 4.4–5.9)
WBC # BLD AUTO: 6.5 10E3/UL (ref 4–11)

## 2025-03-19 PROCEDURE — 36415 COLL VENOUS BLD VENIPUNCTURE: CPT | Performed by: FAMILY MEDICINE

## 2025-03-19 PROCEDURE — 99214 OFFICE O/P EST MOD 30 MIN: CPT | Performed by: FAMILY MEDICINE

## 2025-03-19 PROCEDURE — 80048 BASIC METABOLIC PNL TOTAL CA: CPT | Performed by: FAMILY MEDICINE

## 2025-03-19 PROCEDURE — 3079F DIAST BP 80-89 MM HG: CPT | Performed by: FAMILY MEDICINE

## 2025-03-19 PROCEDURE — 84443 ASSAY THYROID STIM HORMONE: CPT | Performed by: FAMILY MEDICINE

## 2025-03-19 PROCEDURE — 85027 COMPLETE CBC AUTOMATED: CPT | Performed by: FAMILY MEDICINE

## 2025-03-19 PROCEDURE — 3074F SYST BP LT 130 MM HG: CPT | Performed by: FAMILY MEDICINE
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
## 2025-03-20 LAB
ANION GAP SERPL CALCULATED.3IONS-SCNC: 11 MMOL/L (ref 7–15)
BUN SERPL-MCNC: 11.8 MG/DL (ref 6–20)
CALCIUM SERPL-MCNC: 9.9 MG/DL (ref 8.8–10.4)
CHLORIDE SERPL-SCNC: 100 MMOL/L (ref 98–107)
CREAT SERPL-MCNC: 1.01 MG/DL (ref 0.67–1.17)
EGFRCR SERPLBLD CKD-EPI 2021: >90 ML/MIN/1.73M2
GLUCOSE SERPL-MCNC: 108 MG/DL (ref 70–99)
HCO3 SERPL-SCNC: 28 MMOL/L (ref 22–29)
POTASSIUM SERPL-SCNC: 3.7 MMOL/L (ref 3.4–5.3)
SODIUM SERPL-SCNC: 139 MMOL/L (ref 135–145)
TSH SERPL DL<=0.005 MIU/L-ACNC: 3.32 UIU/ML (ref 0.3–4.2)

## 2025-03-31 ENCOUNTER — HOSPITAL ENCOUNTER (OUTPATIENT)
Dept: MRI IMAGING | Facility: CLINIC | Age: 35
Discharge: HOME | End: 2025-03-31
Attending: FAMILY MEDICINE | Admitting: FAMILY MEDICINE
Payer: COMMERCIAL

## 2025-03-31 DIAGNOSIS — M43.6 NECK STIFFNESS: ICD-10-CM

## 2025-03-31 DIAGNOSIS — R42 DIZZINESS: ICD-10-CM

## 2025-03-31 DIAGNOSIS — R41.840 IMPAIRED CONCENTRATION: ICD-10-CM

## 2025-03-31 DIAGNOSIS — R20.2 HAND TINGLING: ICD-10-CM

## 2025-03-31 PROCEDURE — 70551 MRI BRAIN STEM W/O DYE: CPT

## 2025-03-31 PROCEDURE — 72141 MRI NECK SPINE W/O DYE: CPT

## 2025-08-20 ENCOUNTER — OFFICE VISIT (OUTPATIENT)
Dept: FAMILY MEDICINE | Facility: CLINIC | Age: 35
End: 2025-08-20
Payer: COMMERCIAL

## 2025-08-20 VITALS
OXYGEN SATURATION: 100 % | HEART RATE: 65 BPM | SYSTOLIC BLOOD PRESSURE: 112 MMHG | DIASTOLIC BLOOD PRESSURE: 75 MMHG | BODY MASS INDEX: 23.84 KG/M2 | WEIGHT: 157.3 LBS | RESPIRATION RATE: 20 BRPM | HEIGHT: 68 IN | TEMPERATURE: 97.9 F

## 2025-08-20 DIAGNOSIS — F32.0 CURRENT MILD EPISODE OF MAJOR DEPRESSIVE DISORDER, UNSPECIFIED WHETHER RECURRENT: ICD-10-CM

## 2025-08-20 DIAGNOSIS — R68.82 LOW LIBIDO: Primary | ICD-10-CM

## 2025-08-20 DIAGNOSIS — F41.0 PANIC ATTACK: ICD-10-CM

## 2025-08-20 DIAGNOSIS — F07.81 POST CONCUSSION SYNDROME: ICD-10-CM

## 2025-08-20 PROCEDURE — G2211 COMPLEX E/M VISIT ADD ON: HCPCS | Performed by: FAMILY MEDICINE

## 2025-08-20 PROCEDURE — 1126F AMNT PAIN NOTED NONE PRSNT: CPT | Performed by: FAMILY MEDICINE

## 2025-08-20 PROCEDURE — 99214 OFFICE O/P EST MOD 30 MIN: CPT | Performed by: FAMILY MEDICINE

## 2025-08-20 PROCEDURE — 3074F SYST BP LT 130 MM HG: CPT | Performed by: FAMILY MEDICINE

## 2025-08-20 PROCEDURE — 3078F DIAST BP <80 MM HG: CPT | Performed by: FAMILY MEDICINE

## 2025-08-20 RX ORDER — SERTRALINE HYDROCHLORIDE 25 MG/1
25 TABLET, FILM COATED ORAL DAILY
Qty: 90 TABLET | Refills: 1 | Status: SHIPPED | OUTPATIENT
Start: 2025-08-20

## 2025-08-20 ASSESSMENT — ANXIETY QUESTIONNAIRES
5. BEING SO RESTLESS THAT IT IS HARD TO SIT STILL: NEARLY EVERY DAY
7. FEELING AFRAID AS IF SOMETHING AWFUL MIGHT HAPPEN: NEARLY EVERY DAY
2. NOT BEING ABLE TO STOP OR CONTROL WORRYING: NEARLY EVERY DAY
GAD7 TOTAL SCORE: 21
GAD7 TOTAL SCORE: 21
6. BECOMING EASILY ANNOYED OR IRRITABLE: NEARLY EVERY DAY
4. TROUBLE RELAXING: NEARLY EVERY DAY
3. WORRYING TOO MUCH ABOUT DIFFERENT THINGS: NEARLY EVERY DAY
7. FEELING AFRAID AS IF SOMETHING AWFUL MIGHT HAPPEN: NEARLY EVERY DAY
8. IF YOU CHECKED OFF ANY PROBLEMS, HOW DIFFICULT HAVE THESE MADE IT FOR YOU TO DO YOUR WORK, TAKE CARE OF THINGS AT HOME, OR GET ALONG WITH OTHER PEOPLE?: VERY DIFFICULT
1. FEELING NERVOUS, ANXIOUS, OR ON EDGE: NEARLY EVERY DAY
IF YOU CHECKED OFF ANY PROBLEMS ON THIS QUESTIONNAIRE, HOW DIFFICULT HAVE THESE PROBLEMS MADE IT FOR YOU TO DO YOUR WORK, TAKE CARE OF THINGS AT HOME, OR GET ALONG WITH OTHER PEOPLE: VERY DIFFICULT
GAD7 TOTAL SCORE: 21

## 2025-08-20 ASSESSMENT — PAIN SCALES - GENERAL: PAINLEVEL_OUTOF10: NO PAIN (0)

## 2025-09-04 ENCOUNTER — LAB (OUTPATIENT)
Dept: LAB | Facility: CLINIC | Age: 35
End: 2025-09-04
Payer: COMMERCIAL

## 2025-09-04 DIAGNOSIS — R68.82 LOW LIBIDO: ICD-10-CM

## 2025-09-04 DIAGNOSIS — F41.0 PANIC ATTACK: ICD-10-CM

## 2025-09-04 DIAGNOSIS — F32.0 CURRENT MILD EPISODE OF MAJOR DEPRESSIVE DISORDER, UNSPECIFIED WHETHER RECURRENT: ICD-10-CM

## 2025-09-04 DIAGNOSIS — F07.81 POST CONCUSSION SYNDROME: ICD-10-CM

## 2025-09-04 LAB — B BURGDOR IGG+IGM SER QL: 0.09
